# Patient Record
Sex: FEMALE | URBAN - METROPOLITAN AREA
[De-identification: names, ages, dates, MRNs, and addresses within clinical notes are randomized per-mention and may not be internally consistent; named-entity substitution may affect disease eponyms.]

---

## 2023-05-31 ENCOUNTER — ROUTINE PRENATAL (OUTPATIENT)
Dept: OBGYN CLINIC | Age: 33
End: 2023-05-31
Payer: MEDICAID

## 2023-05-31 VITALS
HEIGHT: 64 IN | BODY MASS INDEX: 27.66 KG/M2 | WEIGHT: 162 LBS | SYSTOLIC BLOOD PRESSURE: 110 MMHG | DIASTOLIC BLOOD PRESSURE: 70 MMHG

## 2023-05-31 DIAGNOSIS — O36.80X0 ENCOUNTER TO DETERMINE FETAL VIABILITY OF PREGNANCY, SINGLE OR UNSPECIFIED FETUS: ICD-10-CM

## 2023-05-31 DIAGNOSIS — Z34.81 MULTIGRAVIDA IN FIRST TRIMESTER: ICD-10-CM

## 2023-05-31 DIAGNOSIS — Z34.81 MULTIGRAVIDA IN FIRST TRIMESTER: Primary | ICD-10-CM

## 2023-05-31 LAB
ABO + RH BLD: NORMAL
BASOPHILS # BLD: 0.1 K/UL (ref 0–0.2)
BASOPHILS NFR BLD: 1 % (ref 0–2)
BLOOD GROUP ANTIBODIES SERPL: NORMAL
DIFFERENTIAL METHOD BLD: ABNORMAL
EOSINOPHIL # BLD: 0.3 K/UL (ref 0–0.8)
EOSINOPHIL NFR BLD: 4 % (ref 0.5–7.8)
ERYTHROCYTE [DISTWIDTH] IN BLOOD BY AUTOMATED COUNT: 12 % (ref 11.9–14.6)
HBV SURFACE AG SER QL: NONREACTIVE
HCT VFR BLD AUTO: 35.6 % (ref 35.8–46.3)
HCV AB SER QL: NONREACTIVE
HGB BLD-MCNC: 12 G/DL (ref 11.7–15.4)
HIV 1+2 AB+HIV1 P24 AG SERPL QL IA: NONREACTIVE
HIV 1/2 RESULT COMMENT: NORMAL
IMM GRANULOCYTES # BLD AUTO: 0 K/UL (ref 0–0.5)
IMM GRANULOCYTES NFR BLD AUTO: 0 % (ref 0–5)
LYMPHOCYTES # BLD: 1.6 K/UL (ref 0.5–4.6)
LYMPHOCYTES NFR BLD: 21 % (ref 13–44)
MCH RBC QN AUTO: 31.1 PG (ref 26.1–32.9)
MCHC RBC AUTO-ENTMCNC: 33.7 G/DL (ref 31.4–35)
MCV RBC AUTO: 92.2 FL (ref 82–102)
MONOCYTES # BLD: 0.4 K/UL (ref 0.1–1.3)
MONOCYTES NFR BLD: 6 % (ref 4–12)
NEUTS SEG # BLD: 5.1 K/UL (ref 1.7–8.2)
NEUTS SEG NFR BLD: 68 % (ref 43–78)
NRBC # BLD: 0 K/UL (ref 0–0.2)
PLATELET # BLD AUTO: 239 K/UL (ref 150–450)
PMV BLD AUTO: 11.6 FL (ref 9.4–12.3)
RBC # BLD AUTO: 3.86 M/UL (ref 4.05–5.2)
RUBV IGG SERPL IA-ACNC: >500 IU/ML
WBC # BLD AUTO: 7.4 K/UL (ref 4.3–11.1)

## 2023-05-31 PROCEDURE — 76801 OB US < 14 WKS SINGLE FETUS: CPT | Performed by: OBSTETRICS & GYNECOLOGY

## 2023-05-31 PROCEDURE — 99203 OFFICE O/P NEW LOW 30 MIN: CPT | Performed by: OBSTETRICS & GYNECOLOGY

## 2023-05-31 SDOH — ECONOMIC STABILITY: HOUSING INSECURITY
IN THE LAST 12 MONTHS, WAS THERE A TIME WHEN YOU DID NOT HAVE A STEADY PLACE TO SLEEP OR SLEPT IN A SHELTER (INCLUDING NOW)?: NO

## 2023-05-31 SDOH — ECONOMIC STABILITY: FOOD INSECURITY: WITHIN THE PAST 12 MONTHS, YOU WORRIED THAT YOUR FOOD WOULD RUN OUT BEFORE YOU GOT MONEY TO BUY MORE.: NEVER TRUE

## 2023-05-31 SDOH — ECONOMIC STABILITY: FOOD INSECURITY: WITHIN THE PAST 12 MONTHS, THE FOOD YOU BOUGHT JUST DIDN'T LAST AND YOU DIDN'T HAVE MONEY TO GET MORE.: NEVER TRUE

## 2023-05-31 SDOH — ECONOMIC STABILITY: INCOME INSECURITY: HOW HARD IS IT FOR YOU TO PAY FOR THE VERY BASICS LIKE FOOD, HOUSING, MEDICAL CARE, AND HEATING?: NOT HARD AT ALL

## 2023-05-31 ASSESSMENT — PATIENT HEALTH QUESTIONNAIRE - PHQ9
SUM OF ALL RESPONSES TO PHQ QUESTIONS 1-9: 0
SUM OF ALL RESPONSES TO PHQ QUESTIONS 1-9: 0
SUM OF ALL RESPONSES TO PHQ9 QUESTIONS 1 & 2: 0
2. FEELING DOWN, DEPRESSED OR HOPELESS: 0
1. LITTLE INTEREST OR PLEASURE IN DOING THINGS: 0
SUM OF ALL RESPONSES TO PHQ QUESTIONS 1-9: 0
SUM OF ALL RESPONSES TO PHQ QUESTIONS 1-9: 0

## 2023-05-31 NOTE — PROGRESS NOTES
Patient comes in today for initial prenatal visit. No complaints/concerns today. Fetal Movements:  No  Contractions:  No  Vaginal Bleeding:  No  Leaking Fluid:  No  GI/ issues:  No    Drug/Alcohol 4P's Plus Screening    1. Have either of your parents ever had a problem with drugs/alcohol/prescription drugs? Yes  2. Does your partner have a problem with drugs/alcohol/prescription drugs? No  3. In the past, have you ever had a problem with drugs/alcohol/prescription drugs? No  4. Before you were pregnant, in the past month, have you done any drugs, drank any alcohol or abused any prescription drugs? No  If \"YES\" to any of the above, please give further details:  MOB father was alcoholic 30 years ago    LAST PAP:  about a year ago    LAST MAMMO:  Never    LMP:  Patient's last menstrual period was 03/13/2023.     FAMILY HISTORY OF:   Breast Cancer:  No   Ovarian Cancer:  No   Uterine Cancer:  No   Colon Cancer:  No    Vitals:    05/31/23 0853   BP: 110/70   Site: Left Upper Arm   Position: Sitting   Weight: 162 lb (73.5 kg)   Height: 5' 4\" (1.626 m)        Kenyatta Hensley MA  05/31/23  9:05 AM

## 2023-05-31 NOTE — PROGRESS NOTES
Chief Complaint   Patient presents with    Initial Prenatal Visit    Pregnancy Ultrasound        This 29 y.o. [id number] at 9w0d with Estimated Date of Delivery: 1/3/24 presents for routine prenatal visit. Patient has no complaints today. Pt reports good FM, no LOF, VB, ctx. Pt denies H/A, vision changes, abdom pain, N/V. Vitals:    05/31/23 0853   BP: 110/70   Site: Left Upper Arm   Position: Sitting   Weight: 162 lb (73.5 kg)   Height: 5' 4\" (1.626 m)      Prenatal Physical     OB Prenatal Exam: Last filed by Inderjit Burgos MD on 5/31/2023  9:08 AM     General Physical Exam     HEENT: normal  Heart: normal  Skin: normal     Thyroid: normal  Lungs: normal  Extremities: normal     Lymph Nodes: normal  Neurological: normal  Abdomen: normal                           Patient Active Problem List    Diagnosis Date Noted    Multigravida in first trimester 05/31/2023     Overview Note:     EDC by 9 0/7 week US not C/W LMP       Assessment & Plan Note:     Instructed pt to contact the office or seek immediate care if develops fever > 101.0, severe lower abdominal pain or heavy vaginal bleeding (soaking 2 or more pads per hour). PNLs, new OB packet today        Problem List Items Addressed This Visit        Other    Multigravida in first trimester - Primary     Instructed pt to contact the office or seek immediate care if develops fever > 101.0, severe lower abdominal pain or heavy vaginal bleeding (soaking 2 or more pads per hour).      PNLs, new OB packet today         Relevant Orders    ABO/Rh    Antibody Screen    CBC with Auto Differential    Chlamydia, Gonorrhea, Trichomoniasis    Hemoglobin A1C    Hemoglobinopathy Evaluation    Hepatitis B Surface Antigen    Hepatitis C Antibody    HIV 1/2 Ag/Ab, 4TH Generation,W Rflx Confirm    RPR    Rubella antibody, IgG   Other Visit Diagnoses     Encounter to determine fetal viability of pregnancy, single or unspecified fetus        Relevant Orders    AMB POC US OB < 14

## 2023-06-01 LAB
EST. AVERAGE GLUCOSE BLD GHB EST-MCNC: 105 MG/DL
HBA1C MFR BLD: 5.3 % (ref 4.8–5.6)
RPR SER QL: NONREACTIVE

## 2023-06-02 LAB
HGB A MFR BLD: 97.2 % (ref 96.4–98.8)
HGB A2 MFR BLD COLUMN CHROM: 2.8 % (ref 1.8–3.2)
HGB F MFR BLD: 0 % (ref 0–2)
HGB FRACT BLD-IMP: NORMAL
HGB S MFR BLD: 0 %

## 2023-06-03 LAB
C TRACH RRNA SPEC QL NAA+PROBE: NEGATIVE
N GONORRHOEA RRNA SPEC QL NAA+PROBE: NEGATIVE
SPECIMEN SOURCE: NORMAL
T VAGINALIS RRNA SPEC QL NAA+PROBE: NEGATIVE

## 2023-06-07 ENCOUNTER — TELEPHONE (OUTPATIENT)
Dept: OBGYN CLINIC | Age: 33
End: 2023-06-07

## 2023-06-07 DIAGNOSIS — O21.9 NAUSEA AND VOMITING IN PREGNANCY: Primary | ICD-10-CM

## 2023-06-07 RX ORDER — ONDANSETRON 4 MG/1
4 TABLET, ORALLY DISINTEGRATING ORAL 3 TIMES DAILY PRN
Qty: 21 TABLET | Refills: 2 | Status: SHIPPED | OUTPATIENT
Start: 2023-06-07

## 2023-06-07 NOTE — TELEPHONE ENCOUNTER
Patient has called and sent [employer] message requesting nausea medication due to PN booklet medications not therapeutic for her n/v in pregnancy. Sent zofran RX. Updated patient via [employer] that RX has been sent.

## 2023-06-21 ENCOUNTER — ROUTINE PRENATAL (OUTPATIENT)
Dept: OBGYN CLINIC | Age: 33
End: 2023-06-21
Payer: COMMERCIAL

## 2023-06-21 VITALS
WEIGHT: 163 LBS | BODY MASS INDEX: 27.83 KG/M2 | DIASTOLIC BLOOD PRESSURE: 72 MMHG | HEIGHT: 64 IN | SYSTOLIC BLOOD PRESSURE: 106 MMHG

## 2023-06-21 DIAGNOSIS — Z34.81 MULTIGRAVIDA IN FIRST TRIMESTER: Primary | ICD-10-CM

## 2023-06-21 DIAGNOSIS — O09.291 HISTORY OF MACROSOMIA IN INFANT IN PRIOR PREGNANCY, CURRENTLY PREGNANT IN FIRST TRIMESTER: ICD-10-CM

## 2023-06-21 DIAGNOSIS — O21.9 NAUSEA/VOMITING IN PREGNANCY: ICD-10-CM

## 2023-06-21 PROCEDURE — 99213 OFFICE O/P EST LOW 20 MIN: CPT | Performed by: NURSE PRACTITIONER

## 2023-06-21 ASSESSMENT — PATIENT HEALTH QUESTIONNAIRE - PHQ9
SUM OF ALL RESPONSES TO PHQ9 QUESTIONS 1 & 2: 0
1. LITTLE INTEREST OR PLEASURE IN DOING THINGS: 0
SUM OF ALL RESPONSES TO PHQ QUESTIONS 1-9: 0
2. FEELING DOWN, DEPRESSED OR HOPELESS: 0
SUM OF ALL RESPONSES TO PHQ QUESTIONS 1-9: 0

## 2023-06-21 NOTE — PROGRESS NOTES
This is a 29 y.o.   at 12w0d for routine OB visit. Her Estimated Due Date is 1/3/2024, by Ultrasound    Denies leaking of fluid, vaginal bleeding, or regular contractions. Current Outpatient Medications on File Prior to Visit   Medication Sig Dispense Refill    Doxylamine Succinate, Sleep, (UNISOM PO) Take by mouth      Prenatal Vit-Fe Fumarate-FA (PRENATAL VITAMINS PO) Take by mouth      ondansetron (ZOFRAN-ODT) 4 MG disintegrating tablet Take 1 tablet by mouth 3 times daily as needed for Nausea or Vomiting (Patient not taking: Reported on 2023) 21 tablet 2     No current facility-administered medications on file prior to visit. Allergies   Allergen Reactions    Cefzil [Cefprozil] Hives       OB History    Para Term  AB Living   3 2 2 0 0 2   SAB IAB Ectopic Molar Multiple Live Births   0 0 0 0 0 2       # 1 - Date: 12, Sex: Male, Weight: 9 lb 1 oz (4.111 kg), GA: 40w0d, Delivery: Vaginal, Spontaneous, Apgar1: None, Apgar5: None, Living: Living, Birth Comments: None    # 2 - Date: 19, Sex: Female, Weight: 8 lb 6 oz (3.799 kg), GA: 40w0d, Delivery: Vaginal, Spontaneous, Apgar1: None, Apgar5: None, Living: Living, Birth Comments: None    # 3 - Date: None, Sex: None, Weight: None, GA: None, Delivery: None, Apgar1: None, Apgar5: None, Living: None, Birth Comments: None        History reviewed. No pertinent past medical history.     Past Surgical History:   Procedure Laterality Date    BUNIONECTOMY Left        Family History   Problem Relation Age of Onset    Breast Cancer Neg Hx     Colon Cancer Neg Hx     Uterine Cancer Neg Hx     Ovarian Cancer Neg Hx        Social History     Socioeconomic History    Marital status: Unknown     Spouse name: Not on file    Number of children: Not on file    Years of education: Not on file    Highest education level: Not on file   Occupational History    Not on file   Tobacco Use    Smoking status: Never    Smokeless

## 2023-06-21 NOTE — PROGRESS NOTES
I have reviewed the patient's visit today including history, exam and assessment by TORSTEN Butler. I agree with treatment/plan as above.     Judith Monzon MD  9:32 AM  06/21/23

## 2023-06-21 NOTE — PROGRESS NOTES
Patient comes in today for routine prenatal visit. Pt reports extreme nausea, zofran is not helping.      Fetal Movement: No  Contractions: No  Vaginal Bleeding: No  Leaking Fluid: No  GI/: Yes nausea     Vitals:    06/21/23 0817   BP: 106/72   Site: Left Upper Arm   Position: Sitting   Weight: 163 lb (73.9 kg)   Height: 5' 4\" (1.626 m)

## 2023-06-21 NOTE — ASSESSMENT & PLAN NOTE
PTL/labor precautions, [address], and pregnancy warning signs reviewed. Pt advised to call the office at [phone] or go straight to Labor and Delivery at [facility] with any of the following concerns vaginal bleeding, leaking of fluid, antonio regularly Q 5-7 minutes for over an hour or not feeling the baby move. D/W pt at length genetic testing that is recommended by ACOG -- NIPT, Quad screen (for trisomies and NTD), CF, SMA. Brief discussion of these diseases - conditions that may increase risks, etiology, carrier states, fetal effects, treatment options, etc was undertaken. D/W pt that these are screening tests/carrier screening test only and are NOT mandatory. We also discuss false POS/false neg rates. It is her decision whether to have them done and how to proceed with the information afterwards. All questions answered, pt understood and wishes to proceed with indicated tests.     RTO 4 weeks OBV

## 2023-06-27 ENCOUNTER — TELEPHONE (OUTPATIENT)
Dept: OBGYN CLINIC | Age: 33
End: 2023-06-27

## 2023-06-27 DIAGNOSIS — O21.9 NAUSEA/VOMITING IN PREGNANCY: Primary | ICD-10-CM

## 2023-06-27 RX ORDER — METOCLOPRAMIDE 10 MG/1
TABLET ORAL
Qty: 60 TABLET | Refills: 2 | Status: CANCELLED | OUTPATIENT
Start: 2023-06-27

## 2023-06-27 RX ORDER — METOCLOPRAMIDE 10 MG/1
10 TABLET ORAL
Qty: 90 TABLET | Refills: 1 | Status: SHIPPED | OUTPATIENT
Start: 2023-06-27

## 2023-06-29 LAB
Lab: NEGATIVE
Lab: NORMAL
NTRA CYSTIC FIBROSIS: NEGATIVE
NTRA DUCHENNE/BECKER MUSCULAR DYSTROPHY: NEGATIVE
NTRA FRAGILE X SYNDROME: NEGATIVE
NTRA SPINAL MUSCULAR ATROPHY: NEGATIVE

## 2023-07-18 ENCOUNTER — ROUTINE PRENATAL (OUTPATIENT)
Dept: OBGYN CLINIC | Age: 33
End: 2023-07-18

## 2023-07-18 VITALS
SYSTOLIC BLOOD PRESSURE: 118 MMHG | WEIGHT: 169 LBS | BODY MASS INDEX: 28.85 KG/M2 | DIASTOLIC BLOOD PRESSURE: 72 MMHG | HEIGHT: 64 IN

## 2023-07-18 DIAGNOSIS — Z34.82 MULTIGRAVIDA IN SECOND TRIMESTER: Primary | ICD-10-CM

## 2023-07-18 DIAGNOSIS — O09.292 HISTORY OF MACROSOMIA IN INFANT IN PRIOR PREGNANCY, CURRENTLY PREGNANT IN SECOND TRIMESTER: ICD-10-CM

## 2023-07-18 DIAGNOSIS — Z34.82 MULTIGRAVIDA IN SECOND TRIMESTER: ICD-10-CM

## 2023-07-18 DIAGNOSIS — O21.9 NAUSEA/VOMITING IN PREGNANCY: ICD-10-CM

## 2023-07-18 NOTE — PROGRESS NOTES
Patient comes in today for routine prenatal visit. Patient states she has cramping intermittently.      Fetal Movement: Yes, intermittent fluttering  Contractions: No  Vaginal Bleeding: No  Leaking Fluid: No  GI/: No    Vitals:    07/18/23 0812   BP: 118/72   Site: Left Upper Arm   Position: Sitting   Weight: 169 lb (76.7 kg)   Height: 5' 4\" (1.626 m)

## 2023-07-18 NOTE — PROGRESS NOTES
I have reviewed the patient's visit today including history, exam and assessment by TORSTEN Yarbrough. I agree with treatment/plan as above.     Shaylee Phillips MD  8:44 AM  07/18/23

## 2023-07-18 NOTE — ASSESSMENT & PLAN NOTE
PTL/labor precautions, [place], and pregnancy warning signs reviewed. Pt advised to call the office at [phone] or go straight to Labor and Delivery at [facility] with any of the following concerns vaginal bleeding, leaking of fluid, antonio regularly Q 5-7 minutes for over an hour or not feeling the baby move.    RTO 4 weeks OBV, anatomy US    afp today

## 2023-07-20 LAB
AFP INTERP SERPL-IMP: NORMAL
AFP MOM SERPL: 1.02
AFP SERPL-MCNC: 32.3 NG/ML
AGE AT DELIVERY: 32.6 YR
COMMENT: NORMAL
GA METHOD: NORMAL
GA: 15.9 WEEKS
IDDM PATIENT QL: NO
Lab: NORMAL
MAT SCN FOR FETAL ABNORMALITIES SERPL: NORMAL
MULTIPLE PREGNANCY: NO
NEURAL TUBE DEFECT RISK FETUS: NORMAL

## 2023-08-17 ENCOUNTER — ROUTINE PRENATAL (OUTPATIENT)
Dept: OBGYN CLINIC | Age: 33
End: 2023-08-17
Payer: COMMERCIAL

## 2023-08-17 VITALS
BODY MASS INDEX: 29.37 KG/M2 | HEIGHT: 64 IN | SYSTOLIC BLOOD PRESSURE: 116 MMHG | DIASTOLIC BLOOD PRESSURE: 70 MMHG | WEIGHT: 172 LBS

## 2023-08-17 DIAGNOSIS — O21.9 NAUSEA/VOMITING IN PREGNANCY: ICD-10-CM

## 2023-08-17 DIAGNOSIS — Z34.82 MULTIGRAVIDA IN SECOND TRIMESTER: ICD-10-CM

## 2023-08-17 DIAGNOSIS — Z36.89 ENCOUNTER FOR FETAL ANATOMIC SURVEY: Primary | ICD-10-CM

## 2023-08-17 DIAGNOSIS — O09.292 HISTORY OF MACROSOMIA IN INFANT IN PRIOR PREGNANCY, CURRENTLY PREGNANT IN SECOND TRIMESTER: ICD-10-CM

## 2023-08-17 PROCEDURE — 76805 OB US >/= 14 WKS SNGL FETUS: CPT | Performed by: NURSE PRACTITIONER

## 2023-08-17 PROCEDURE — 99213 OFFICE O/P EST LOW 20 MIN: CPT | Performed by: NURSE PRACTITIONER

## 2023-08-17 ASSESSMENT — PATIENT HEALTH QUESTIONNAIRE - PHQ9
SUM OF ALL RESPONSES TO PHQ9 QUESTIONS 1 & 2: 0
SUM OF ALL RESPONSES TO PHQ QUESTIONS 1-9: 0
1. LITTLE INTEREST OR PLEASURE IN DOING THINGS: 0
2. FEELING DOWN, DEPRESSED OR HOPELESS: 0
SUM OF ALL RESPONSES TO PHQ QUESTIONS 1-9: 0

## 2023-08-17 NOTE — PROGRESS NOTES
I have reviewed the patient's visit today including history, exam and assessment by Michelle Nielson NP-BC. I agree with treatment/plan as above.     Rylan Andrews MD  9:58 AM  08/17/23
Patient comes in today for routine prenatal visit. No complaints/concerns today.      Fetal Movement: Yes  Contractions: No  Vaginal Bleeding: No  Leaking Fluid: No  GI/: No    Vitals:    08/17/23 0851   BP: 116/70   Site: Left Upper Arm   Position: Sitting   Weight: 172 lb (78 kg)   Height: 5' 4\" (1.626 m)
tab which helps a lot. Plan to increase to 1 full tab in evening and start back zofran during the day. If no relief, plan to add on promethazine or reglan    7/18/23: improved with vit b6 and unisom  8/17/23: resolved        Multigravida in second trimester 05/31/2023     Overview Note:     EDC by 9 0/7 week US not C/W LMP    6/26/23: NIPT neg x3           Assessment & Plan Note:     PTL/labor precautions, [place], and pregnancy warning signs reviewed. Pt advised to call the office at [phone] or go straight to Labor and Delivery at [facility] with any of the following concerns vaginal bleeding, leaking of fluid, antonio regularly Q 5-7 minutes for over an hour or not feeling the baby move. RTO 4 weeks f/u anatomy    Anatomy us today nl but incomplete (profile, ductal arch)           Problem List Items Addressed This Visit          Digestive    Nausea/vomiting in pregnancy       Other    History of macrosomia in infant in prior pregnancy, currently pregnant in second trimester     noted           Relevant Orders    AMB POC US OB >= 14 WKS, 1ST GESTATION (Completed)    Multigravida in second trimester     PTL/labor precautions, [place], and pregnancy warning signs reviewed. Pt advised to call the office at [phone] or go straight to Labor and Delivery at [facility] with any of the following concerns vaginal bleeding, leaking of fluid, antonio regularly Q 5-7 minutes for over an hour or not feeling the baby move.    RTO 4 weeks f/u anatomy    Anatomy us today nl but incomplete (profile, ductal arch)           Relevant Orders    AMB POC US OB >= 14 WKS, 1ST GESTATION (Completed)     Other Visit Diagnoses       Encounter for fetal anatomic survey    -  Primary    Relevant Orders    AMB POC US OB >= 14 WKS, 1ST GESTATION (Completed)            Orders Placed This Encounter   Procedures    AMB POC US OB >= 14 WKS, 1ST GESTATION       Outpatient Encounter Medications as of 8/17/2023   Medication

## 2023-08-17 NOTE — ASSESSMENT & PLAN NOTE
PTL/labor precautions, [place], and pregnancy warning signs reviewed. Pt advised to call the office at [phone] or go straight to Labor and Delivery at [facility] with any of the following concerns vaginal bleeding, leaking of fluid, antonio regularly Q 5-7 minutes for over an hour or not feeling the baby move.    RTO 4 weeks f/u anatomy    Anatomy us today nl but incomplete (profile, ductal arch)

## 2023-09-14 ENCOUNTER — ROUTINE PRENATAL (OUTPATIENT)
Dept: OBGYN CLINIC | Age: 33
End: 2023-09-14

## 2023-09-14 VITALS
WEIGHT: 170 LBS | BODY MASS INDEX: 29.02 KG/M2 | SYSTOLIC BLOOD PRESSURE: 114 MMHG | DIASTOLIC BLOOD PRESSURE: 70 MMHG | HEIGHT: 64 IN

## 2023-09-14 DIAGNOSIS — Z36.2 ENCOUNTER FOR FOLLOW-UP ULTRASOUND OF FETAL ANATOMY: Primary | ICD-10-CM

## 2023-09-14 DIAGNOSIS — O09.292 HISTORY OF MACROSOMIA IN INFANT IN PRIOR PREGNANCY, CURRENTLY PREGNANT IN SECOND TRIMESTER: ICD-10-CM

## 2023-09-14 DIAGNOSIS — Z34.82 MULTIGRAVIDA IN SECOND TRIMESTER: ICD-10-CM

## 2023-09-14 ASSESSMENT — PATIENT HEALTH QUESTIONNAIRE - PHQ9
SUM OF ALL RESPONSES TO PHQ QUESTIONS 1-9: 0
2. FEELING DOWN, DEPRESSED OR HOPELESS: 0
1. LITTLE INTEREST OR PLEASURE IN DOING THINGS: 0
SUM OF ALL RESPONSES TO PHQ9 QUESTIONS 1 & 2: 0
SUM OF ALL RESPONSES TO PHQ QUESTIONS 1-9: 0

## 2023-09-14 NOTE — ASSESSMENT & PLAN NOTE
PTL/labor precautions, [place], and pregnancy warning signs reviewed. Pt advised to call the office at [phone] or go straight to Labor and Delivery at [facility] with any of the following concerns vaginal bleeding, leaking of fluid, antonio regularly Q 5-7 minutes for over an hour or not feeling the baby move.    F/u anatomy US today normal  RTO 4 weeks OBV, glucola, cbc, tdap

## 2023-09-14 NOTE — PROGRESS NOTES
I have reviewed the patient's visit today including history, exam and assessment by TORSTEN Mayers. I agree with treatment/plan as above.     Rachid Arango MD  9:24 AM  09/14/23
Patient comes in today for routine prenatal visit. Pt states about 3 times a day she has some tightness in her lower abdomen but does not last long.      Fetal Movement: Yes  Contractions: No  Vaginal Bleeding: No  Leaking Fluid: No  GI/: No    Vitals:    09/14/23 0849   BP: 114/70   Site: Left Upper Arm   Position: Sitting   Weight: 170 lb (77.1 kg)   Height: 5' 4\" (1.626 m)
Currently    Sexual activity: Yes     Partners: Male   Other Topics Concern    Not on file   Social History Narrative    Abuse: Feels safe at home, no history of physical abuse, no history of sexual abuse      Social Determinants of Health     Financial Resource Strain: Low Risk  (5/31/2023)    Overall Financial Resource Strain (CARDIA)     Difficulty of Paying Living Expenses: Not hard at all   Food Insecurity: No Food Insecurity (5/31/2023)    Hunger Vital Sign     Worried About Running Out of Food in the Last Year: Never true     Ran Out of Food in the Last Year: Never true   Transportation Needs: Unknown (5/31/2023)    PRAPARE - Transportation     Lack of Transportation (Medical): Not on file     Lack of Transportation (Non-Medical): No   Physical Activity: Not on file   Stress: Not on file   Social Connections: Not on file   Intimate Partner Violence: Not on file   Housing Stability: Unknown (5/31/2023)    Housing Stability Vital Sign     Unable to Pay for Housing in the Last Year: Not on file     Number of [address] in the Last Year: Not on file     Unstable Housing in the Last Year: No           Objective    Vitals:    09/14/23 0849   BP: 114/70   Site: Left Upper Arm   Position: Sitting   Weight: 170 lb (77.1 kg)   Height: 5' 4\" (1.626 m)       General: well developed, well nourished, in no acute distress    Head: normocephalic and atraumatic    Resp: even and unlabored    Psych: Normal mood and affect        Assessment and Plan      Patient Active Problem List    Diagnosis Date Noted    History of macrosomia in infant in prior pregnancy, currently pregnant in second trimester 06/21/2023     Overview Note:     G1 9lb 1 oz       Assessment & Plan Note:     noted      Nausea/vomiting in pregnancy 06/21/2023     Overview Note:     6/21/23: Nausea all day, vomiting once in evening. Taking unisom 1/2 tab which helps a lot. Plan to increase to 1 full tab in evening and start back zofran during the day.  If no

## 2023-10-12 ENCOUNTER — ROUTINE PRENATAL (OUTPATIENT)
Dept: OBGYN CLINIC | Age: 33
End: 2023-10-12
Payer: COMMERCIAL

## 2023-10-12 VITALS
WEIGHT: 182 LBS | DIASTOLIC BLOOD PRESSURE: 80 MMHG | HEIGHT: 64 IN | BODY MASS INDEX: 31.07 KG/M2 | SYSTOLIC BLOOD PRESSURE: 122 MMHG

## 2023-10-12 DIAGNOSIS — O09.293 HISTORY OF MACROSOMIA IN INFANT IN PRIOR PREGNANCY, CURRENTLY PREGNANT IN THIRD TRIMESTER: ICD-10-CM

## 2023-10-12 DIAGNOSIS — Z34.83 MULTIGRAVIDA IN THIRD TRIMESTER: ICD-10-CM

## 2023-10-12 DIAGNOSIS — Z34.82 MULTIGRAVIDA IN SECOND TRIMESTER: Primary | ICD-10-CM

## 2023-10-12 LAB
ERYTHROCYTE [DISTWIDTH] IN BLOOD BY AUTOMATED COUNT: 13.2 % (ref 11.9–14.6)
GLUCOSE 1 HOUR: 164 MG/DL
HCT VFR BLD AUTO: 33.7 % (ref 35.8–46.3)
HGB BLD-MCNC: 10.7 G/DL (ref 11.7–15.4)
MCH RBC QN AUTO: 29.5 PG (ref 26.1–32.9)
MCHC RBC AUTO-ENTMCNC: 31.8 G/DL (ref 31.4–35)
MCV RBC AUTO: 92.8 FL (ref 82–102)
NRBC # BLD: 0 K/UL (ref 0–0.2)
PLATELET # BLD AUTO: 153 K/UL (ref 150–450)
PMV BLD AUTO: 11.5 FL (ref 9.4–12.3)
RBC # BLD AUTO: 3.63 M/UL (ref 4.05–5.2)
WBC # BLD AUTO: 7.6 K/UL (ref 4.3–11.1)

## 2023-10-12 PROCEDURE — 90715 TDAP VACCINE 7 YRS/> IM: CPT | Performed by: NURSE PRACTITIONER

## 2023-10-12 PROCEDURE — 99213 OFFICE O/P EST LOW 20 MIN: CPT | Performed by: NURSE PRACTITIONER

## 2023-10-12 PROCEDURE — 90471 IMMUNIZATION ADMIN: CPT | Performed by: NURSE PRACTITIONER

## 2023-10-12 NOTE — ASSESSMENT & PLAN NOTE
PTL/labor precautions, [place], and pregnancy warning signs reviewed. Pt advised to call the office at [phone] or go straight to Labor and Delivery at [facility] with any of the following concerns vaginal bleeding, leaking of fluid, antonio regularly Q 5-7 minutes for over an hour or not feeling the baby move.    RTO 2 weeks with growth US  Glucola, cbc, tdap today

## 2023-10-13 DIAGNOSIS — O99.013 ANEMIA COMPLICATING PREGNANCY, THIRD TRIMESTER: ICD-10-CM

## 2023-10-13 DIAGNOSIS — O99.810 ABNORMAL GLUCOSE AFFECTING PREGNANCY: ICD-10-CM

## 2023-10-13 RX ORDER — FERROUS SULFATE 325(65) MG
325 TABLET ORAL 2 TIMES DAILY
Qty: 60 TABLET | Refills: 6 | Status: SHIPPED | OUTPATIENT
Start: 2023-10-13

## 2023-10-13 RX ORDER — DOCUSATE SODIUM 100 MG/1
100 CAPSULE, LIQUID FILLED ORAL 2 TIMES DAILY PRN
Qty: 60 CAPSULE | Refills: 6 | Status: SHIPPED | OUTPATIENT
Start: 2023-10-13

## 2023-10-13 NOTE — TELEPHONE ENCOUNTER
Called pt, message below reviewed with pt, pt voiced understanding and 3hr scheduled.      RX pend     Diet sent on [employer]

## 2023-10-13 NOTE — TELEPHONE ENCOUNTER
Please call patient and let her know that her hemoglobin was low. She needs to start taking     FeSO4 325mg PO BID Disp: 60 RF:6  Colace 100mg PO BID Disp: 60 RF: 6 prn for constipation    Also encourage foods that are high in iron. Also increase fluids and foods high in fiber to prevent constipation. 2. Patient has failed 1 hr Glucola. Needs to be scheduled for 3hr GTT.

## 2023-10-16 DIAGNOSIS — O99.810 ABNORMAL GLUCOSE AFFECTING PREGNANCY: Primary | ICD-10-CM

## 2023-10-18 DIAGNOSIS — O24.410 DIET CONTROLLED GESTATIONAL DIABETES MELLITUS (GDM) IN THIRD TRIMESTER: Primary | ICD-10-CM

## 2023-10-18 RX ORDER — GLUCOSAMINE HCL/CHONDROITIN SU 500-400 MG
1 CAPSULE ORAL 4 TIMES DAILY
Qty: 100 STRIP | Refills: 6 | Status: SHIPPED | OUTPATIENT
Start: 2023-10-18 | End: 2023-10-19

## 2023-10-18 RX ORDER — LANCETS 30 GAUGE
1 EACH MISCELLANEOUS 4 TIMES DAILY
Qty: 100 EACH | Refills: 6 | Status: SHIPPED | OUTPATIENT
Start: 2023-10-18 | End: 2023-10-19

## 2023-10-18 NOTE — TELEPHONE ENCOUNTER
Called pt, message below reviewed with pt, pt voiced understanding.      Order signed   Beba Mccullough

## 2023-10-18 NOTE — TELEPHONE ENCOUNTER
Patient did not pass 3hr GTT. New diagnosis of Gestational Diabetes. 1.) Pt needs to be scheduled for Diabetes Education at [facility]. I pended the order for the referral. They should call her soon to schedule. 2.) Pt needs to start checking blood sugars four times daily. Fasting and 1 hours after breakfast, lunch and dinner. A prescription for blood glucose meter, strips and lancets needs to be sent to the pharmacy. Glucometer   Glucose Test Strips Use four times daily to check blood sugars Disp: 100 RF: 6   Glucose Lancets Use four times daily to check blood sugars Disp: 100 RF:6    3.) Pt needs to write down her blood sugars and bring them to each visit here.

## 2023-10-19 ENCOUNTER — FOLLOWUP TELEPHONE ENCOUNTER (OUTPATIENT)
Dept: DIABETES SERVICES | Age: 33
End: 2023-10-19

## 2023-10-19 DIAGNOSIS — O24.410 DIET CONTROLLED GESTATIONAL DIABETES MELLITUS (GDM) IN THIRD TRIMESTER: Primary | ICD-10-CM

## 2023-10-19 RX ORDER — LANCETS 30 GAUGE
1 EACH MISCELLANEOUS 4 TIMES DAILY
Qty: 100 EACH | Refills: 6 | Status: SHIPPED | OUTPATIENT
Start: 2023-10-19

## 2023-10-19 RX ORDER — BLOOD SUGAR DIAGNOSTIC
STRIP MISCELLANEOUS
Qty: 100 STRIP | Refills: 6 | Status: SHIPPED | OUTPATIENT
Start: 2023-10-19

## 2023-10-19 RX ORDER — BLOOD-GLUCOSE METER
EACH MISCELLANEOUS
Qty: 1 EACH | Refills: 0 | Status: SHIPPED | OUTPATIENT
Start: 2023-10-19

## 2023-10-19 NOTE — TELEPHONE ENCOUNTER
----- Message from [address] [place] [address]. Jaylon sent at 10/19/2023  8:42 AM EDT -----  Regarding: Diabetes monitor  Contact: [phone]  They didn't so I had checked my insurance website and the only thing they had listed was written as \"blood glucose monitoring 333 device\" just as a generic name so I'm not sure.

## 2023-10-20 ENCOUNTER — FOLLOWUP TELEPHONE ENCOUNTER (OUTPATIENT)
Dept: DIABETES SERVICES | Age: 33
End: 2023-10-20

## 2023-10-26 ENCOUNTER — ROUTINE PRENATAL (OUTPATIENT)
Dept: OBGYN CLINIC | Age: 33
End: 2023-10-26

## 2023-10-26 VITALS
WEIGHT: 179 LBS | SYSTOLIC BLOOD PRESSURE: 122 MMHG | HEIGHT: 64 IN | DIASTOLIC BLOOD PRESSURE: 74 MMHG | BODY MASS INDEX: 30.56 KG/M2

## 2023-10-26 DIAGNOSIS — O99.013 ANEMIA COMPLICATING PREGNANCY, THIRD TRIMESTER: ICD-10-CM

## 2023-10-26 DIAGNOSIS — O24.410 DIET CONTROLLED GESTATIONAL DIABETES MELLITUS (GDM) IN THIRD TRIMESTER: Primary | ICD-10-CM

## 2023-10-26 DIAGNOSIS — Z34.83 MULTIGRAVIDA IN THIRD TRIMESTER: ICD-10-CM

## 2023-10-26 DIAGNOSIS — O09.293 HISTORY OF MACROSOMIA IN INFANT IN PRIOR PREGNANCY, CURRENTLY PREGNANT IN THIRD TRIMESTER: ICD-10-CM

## 2023-10-26 NOTE — ASSESSMENT & PLAN NOTE
D/W pt at length effects of diabetes in pregnancy and importance of glucose control Negative pregnancy effects discussed including but not limited to: IUFD, placental disease, macrosomia, shoulder dystocia, delayed lung maturity and permanent fetal damage. Encouraged compliance with diet, checking blood sugars, and medications if prescribed. Also encouraged patient to bring logs to each visit.

## 2023-10-26 NOTE — PROGRESS NOTES
Patient comes in today for routine prenatal visit. No complaints/concerns today.      Fetal Movement: Yes  Contractions: No  Vaginal Bleeding: No  Leaking Fluid: No  GI/: No    Vitals:    10/26/23 1456   BP: 122/74   Site: Left Upper Arm   Position: Sitting   Weight: 81.2 kg (179 lb)   Height: 1.626 m (5' 4\")

## 2023-10-26 NOTE — ASSESSMENT & PLAN NOTE
PTL/labor precautions, [place], and pregnancy warning signs reviewed. Pt advised to call the office at [phone] or go straight to Labor and Delivery at [facility] with any of the following concerns vaginal bleeding, leaking of fluid, antonio regularly Q 5-7 minutes for over an hour or not feeling the baby move.    RTO 2 weeks

## 2023-10-27 ENCOUNTER — FOLLOWUP TELEPHONE ENCOUNTER (OUTPATIENT)
Dept: DIABETES SERVICES | Age: 33
End: 2023-10-27

## 2023-10-27 NOTE — PROGRESS NOTES
I have reviewed the patient's visit today including history, exam and assessment by TORSTEN Aragon. I agree with treatment/plan as above.     Sadie Zhu MD  8:07 AM  10/27/23

## 2023-10-30 ENCOUNTER — FOLLOWUP TELEPHONE ENCOUNTER (OUTPATIENT)
Dept: DIABETES SERVICES | Age: 33
End: 2023-10-30

## 2023-11-09 ENCOUNTER — TELEPHONE (OUTPATIENT)
Dept: OBGYN CLINIC | Age: 33
End: 2023-11-09

## 2023-11-09 ENCOUNTER — PATIENT MESSAGE (OUTPATIENT)
Dept: OBGYN CLINIC | Age: 33
End: 2023-11-09

## 2023-11-09 DIAGNOSIS — O24.410 DIET CONTROLLED GESTATIONAL DIABETES MELLITUS (GDM) IN THIRD TRIMESTER: ICD-10-CM

## 2023-11-09 DIAGNOSIS — O24.410 DIET CONTROLLED GESTATIONAL DIABETES MELLITUS (GDM) IN THIRD TRIMESTER: Primary | ICD-10-CM

## 2023-11-09 NOTE — TELEPHONE ENCOUNTER
Called patient, updated her that PA has been submitted and at this time it can 48-72 hours for there to be a PA response. I stated to patient that typically the response time is sooner, patient will be updated as soon as there is a statement of approval or denial.     Patient verbalized understanding and stated that she has enough test strips for 2-3 days in the meantime.

## 2023-11-09 NOTE — PROGRESS NOTES
Re-sent BSG kit for which ever is covered by insurance due to pharmacy requesting PA for accu-check test strips.

## 2023-11-09 NOTE — TELEPHONE ENCOUNTER
From: Nelson Guevara  To: Luke Izaguirre  Sent: 11/9/2023 2:43 PM EST  Subject: Glucose test strips    Good afternoon, the pharmacy told me I needed to ask you guys to get prior authorization for the test strips because they will only cover 100 strips every 30 days not 25 days so the insurance won't let me refill the prescription yet without prior authorization and I have enough left for about 2 days. Any help is appreciated. Thank you!

## 2023-11-14 ENCOUNTER — ROUTINE PRENATAL (OUTPATIENT)
Dept: OBGYN CLINIC | Age: 33
End: 2023-11-14
Payer: COMMERCIAL

## 2023-11-14 VITALS
BODY MASS INDEX: 30.9 KG/M2 | SYSTOLIC BLOOD PRESSURE: 122 MMHG | WEIGHT: 181 LBS | HEIGHT: 64 IN | DIASTOLIC BLOOD PRESSURE: 74 MMHG

## 2023-11-14 DIAGNOSIS — O09.293 HISTORY OF MACROSOMIA IN INFANT IN PRIOR PREGNANCY, CURRENTLY PREGNANT IN THIRD TRIMESTER: ICD-10-CM

## 2023-11-14 DIAGNOSIS — O24.410 DIET CONTROLLED GESTATIONAL DIABETES MELLITUS (GDM) IN THIRD TRIMESTER: ICD-10-CM

## 2023-11-14 DIAGNOSIS — Z34.83 MULTIGRAVIDA IN THIRD TRIMESTER: ICD-10-CM

## 2023-11-14 DIAGNOSIS — O99.013 ANEMIA COMPLICATING PREGNANCY, THIRD TRIMESTER: ICD-10-CM

## 2023-11-14 PROBLEM — O21.9 NAUSEA/VOMITING IN PREGNANCY: Status: RESOLVED | Noted: 2023-06-21 | Resolved: 2023-11-14

## 2023-11-14 PROCEDURE — 99213 OFFICE O/P EST LOW 20 MIN: CPT | Performed by: OBSTETRICS & GYNECOLOGY

## 2023-11-14 NOTE — TELEPHONE ENCOUNTER
Sent [employer] message to patient trying to clarify concerns regarding her insurance due to recent PA response from [employer] that the request is being voided due to member no longer active with this plan.

## 2023-11-14 NOTE — PROGRESS NOTES
Patient comes in today for routine prenatal visit. No complaints/concerns today. Patient denies needing PA for BSG test strips due to not having an issue refilling her test strips at this time.       Fetal Movement: Yes  Contractions: No  Vaginal Bleeding: No  Leaking Fluid: No  GI/: No    Vitals:    11/14/23 1421   BP: 122/74   Site: Left Upper Arm   Position: Sitting   Weight: 82.1 kg (181 lb)   Height: 1.626 m (5' 4\")

## 2023-11-27 ENCOUNTER — ROUTINE PRENATAL (OUTPATIENT)
Dept: OBGYN CLINIC | Age: 33
End: 2023-11-27

## 2023-11-27 VITALS
DIASTOLIC BLOOD PRESSURE: 70 MMHG | SYSTOLIC BLOOD PRESSURE: 118 MMHG | HEIGHT: 64 IN | BODY MASS INDEX: 30.73 KG/M2 | WEIGHT: 180 LBS

## 2023-11-27 DIAGNOSIS — Z34.83 MULTIGRAVIDA IN THIRD TRIMESTER: ICD-10-CM

## 2023-11-27 DIAGNOSIS — O24.410 DIET CONTROLLED GESTATIONAL DIABETES MELLITUS (GDM) IN THIRD TRIMESTER: Primary | ICD-10-CM

## 2023-11-27 DIAGNOSIS — O99.013 ANEMIA COMPLICATING PREGNANCY, THIRD TRIMESTER: ICD-10-CM

## 2023-11-27 DIAGNOSIS — O09.293 HISTORY OF MACROSOMIA IN INFANT IN PRIOR PREGNANCY, CURRENTLY PREGNANT IN THIRD TRIMESTER: ICD-10-CM

## 2023-11-27 NOTE — PROGRESS NOTES
Patient comes in today for routine prenatal visit. No complaints/concerns today.      Fetal Movement: Yes  Contractions: No  Vaginal Bleeding: No  Leaking Fluid: No  GI/: No    Vitals:    11/27/23 0930   BP: 118/70   Site: Left Upper Arm   Position: Sitting   Weight: 81.6 kg (180 lb)   Height: 1.626 m (5' 4\")

## 2023-11-27 NOTE — ASSESSMENT & PLAN NOTE
Log reviewed - excellent control  D/W pt at length gestational diabetes and importance of glucose control with possible negative pregnancy effects including but not limited to: fetal death, macrosomia, shoulder dystocia, delayed lung maturity and permanent fetal damage. Encouraged compliance with diet, QID accuchecks and bringing log to each visit.

## 2023-11-27 NOTE — PATIENT INSTRUCTIONS
Please do the breech exercises as we discussed  eep following your diet and checking your sugars 4 times a day as directed. Please bring your log to each visit. If you develop signs and symptoms of  labor including but not limited to regular uterine contractions every 5-7 minutes for 1 hour, vaginal bleeding or leakage of fluid please contact our office and/or seek immediate care. Thanks for coming to see us today and letting us take care of you!

## 2023-12-04 ENCOUNTER — ROUTINE PRENATAL (OUTPATIENT)
Dept: OBGYN CLINIC | Age: 33
End: 2023-12-04
Payer: COMMERCIAL

## 2023-12-04 VITALS
BODY MASS INDEX: 31.07 KG/M2 | WEIGHT: 182 LBS | SYSTOLIC BLOOD PRESSURE: 122 MMHG | HEIGHT: 64 IN | DIASTOLIC BLOOD PRESSURE: 74 MMHG

## 2023-12-04 DIAGNOSIS — Z34.83 MULTIGRAVIDA IN THIRD TRIMESTER: Primary | ICD-10-CM

## 2023-12-04 DIAGNOSIS — O24.410 DIET CONTROLLED GESTATIONAL DIABETES MELLITUS (GDM) IN THIRD TRIMESTER: ICD-10-CM

## 2023-12-04 DIAGNOSIS — O99.013 ANEMIA COMPLICATING PREGNANCY, THIRD TRIMESTER: ICD-10-CM

## 2023-12-04 DIAGNOSIS — O09.293 HISTORY OF MACROSOMIA IN INFANT IN PRIOR PREGNANCY, CURRENTLY PREGNANT IN THIRD TRIMESTER: ICD-10-CM

## 2023-12-04 PROCEDURE — 99213 OFFICE O/P EST LOW 20 MIN: CPT | Performed by: OBSTETRICS & GYNECOLOGY

## 2023-12-04 PROCEDURE — 59025 FETAL NON-STRESS TEST: CPT | Performed by: OBSTETRICS & GYNECOLOGY

## 2023-12-04 NOTE — PROGRESS NOTES
Patient comes in today for routine prenatal visit. No complaints/concerns today.      Fetal Movement: Yes  Contractions: No  Vaginal Bleeding: No  Leaking Fluid: No  GI/: No    Vitals:    12/04/23 0818   BP: 122/74   Site: Left Upper Arm   Position: Sitting   Weight: 82.6 kg (182 lb)   Height: 1.626 m (5' 4\")

## 2023-12-04 NOTE — PATIENT INSTRUCTIONS
Keep following your diet and checking your sugars 4 times a day as directed. Please bring your log to each visit. If you develop signs and symptoms of  labor including but not limited to regular uterine contractions every 5-7 minutes for 1 hour, vaginal bleeding or leakage of fluid please contact our office and/or seek immediate care. Thanks for coming to see us today and letting us take care of you!

## 2023-12-04 NOTE — ASSESSMENT & PLAN NOTE
Educated patient of signs and symptoms of  labor including but not limited to regular uterine contractions every 5-7 minutes for 1 hour, vaginal bleeding or leakage of fluid to seek immediate care.
Log reviewed - excellent control  D/W pt at length gestational diabetes and importance of glucose control with possible negative pregnancy effects including but not limited to: fetal death, macrosomia, shoulder dystocia, delayed lung maturity and permanent fetal damage. Encouraged compliance with diet, QID accuchecks and bringing log to each visit.
noted
noted
 Symptoms

## 2023-12-04 NOTE — PROGRESS NOTES
Chief Complaint   Patient presents with    Routine Prenatal Visit     W/ NST        This 29 y.o. [id number] at 35w5d with Estimated Date of Delivery: 1/3/24 presents for routine prenatal visit. Patient has no complaints today. Pt reports good FM, no LOF, VB, ctx. Pt denies H/A, vision changes, abdom pain, N/V. Vitals:    12/04/23 0818   BP: 122/74   Site: Left Upper Arm   Position: Sitting   Weight: 82.6 kg (182 lb)   Height: 1.626 m (5' 4\")        Patient Active Problem List    Diagnosis Date Noted    Breech presentation of fetus 11/27/2023     Overview Note:     11/27/23:  EFW 17%, AC 14%, ELIN 9.1 cm, BREECH      Anemia complicating pregnancy, third trimester 10/13/2023     Overview Note:     Add'l Fe       Assessment & Plan Note:     noted      Diet controlled gestational diabetes mellitus (GDM) in third trimester 10/13/2023     Overview Note:     Failed 1 hr glucola, 3 hr FAILED    10/26/2023: BS log reviewed, mostly all within target. Pt has not yet scheduled diet teaching appt, advised to call and schedule. EFW 22%, AC 24%, ELIN nl, BREECH  11/27/23:  EFW 17%, AC 14%, ELIN 9.1 cm, BREECH       Assessment & Plan Note:     Log reviewed - excellent control  D/W pt at length gestational diabetes and importance of glucose control with possible negative pregnancy effects including but not limited to: fetal death, macrosomia, shoulder dystocia, delayed lung maturity and permanent fetal damage. Encouraged compliance with diet, QID accuchecks and bringing log to each visit. History of macrosomia in infant in prior pregnancy, currently pregnant in third trimester 06/21/2023     Overview Note:     G1 9lb 1 oz    10/26/2023: JANUARY signed for records today. Pt reports episiotomy/vacuum with G1, unsure if any shoulder dystocia was present. 11/14/2023: records received from delivery 2019. VAVD with RML episiotomy. No evidence of shoulder dystocia.    11/27/23:  EFW 17%, AC 14%, ELIN 9.1 cm, BREECH       Assessment & Plan

## 2023-12-12 ENCOUNTER — ROUTINE PRENATAL (OUTPATIENT)
Dept: OBGYN CLINIC | Age: 33
End: 2023-12-12
Payer: COMMERCIAL

## 2023-12-12 VITALS
BODY MASS INDEX: 30.73 KG/M2 | HEIGHT: 64 IN | DIASTOLIC BLOOD PRESSURE: 62 MMHG | SYSTOLIC BLOOD PRESSURE: 114 MMHG | WEIGHT: 180 LBS

## 2023-12-12 DIAGNOSIS — O99.013 ANEMIA COMPLICATING PREGNANCY, THIRD TRIMESTER: ICD-10-CM

## 2023-12-12 DIAGNOSIS — Z34.83 MULTIGRAVIDA IN THIRD TRIMESTER: ICD-10-CM

## 2023-12-12 DIAGNOSIS — O24.410 DIET CONTROLLED GESTATIONAL DIABETES MELLITUS (GDM) IN THIRD TRIMESTER: ICD-10-CM

## 2023-12-12 DIAGNOSIS — O09.293 HISTORY OF MACROSOMIA IN INFANT IN PRIOR PREGNANCY, CURRENTLY PREGNANT IN THIRD TRIMESTER: ICD-10-CM

## 2023-12-12 LAB
BASOPHILS # BLD: 0 K/UL (ref 0–0.2)
BASOPHILS NFR BLD: 1 % (ref 0–2)
DIFFERENTIAL METHOD BLD: ABNORMAL
EOSINOPHIL # BLD: 0.2 K/UL (ref 0–0.8)
EOSINOPHIL NFR BLD: 3 % (ref 0.5–7.8)
ERYTHROCYTE [DISTWIDTH] IN BLOOD BY AUTOMATED COUNT: 14.1 % (ref 11.9–14.6)
HCT VFR BLD AUTO: 34 % (ref 35.8–46.3)
HGB BLD-MCNC: 11.3 G/DL (ref 11.7–15.4)
IMM GRANULOCYTES # BLD AUTO: 0 K/UL (ref 0–0.5)
IMM GRANULOCYTES NFR BLD AUTO: 0 % (ref 0–5)
LYMPHOCYTES # BLD: 1.3 K/UL (ref 0.5–4.6)
LYMPHOCYTES NFR BLD: 18 % (ref 13–44)
MCH RBC QN AUTO: 30.5 PG (ref 26.1–32.9)
MCHC RBC AUTO-ENTMCNC: 33.2 G/DL (ref 31.4–35)
MCV RBC AUTO: 91.6 FL (ref 82–102)
MONOCYTES # BLD: 0.5 K/UL (ref 0.1–1.3)
MONOCYTES NFR BLD: 7 % (ref 4–12)
NEUTS SEG NFR BLD: 71 % (ref 43–78)
NRBC # BLD: 0 K/UL (ref 0–0.2)
PLATELET # BLD AUTO: 143 K/UL (ref 150–450)
PMV BLD AUTO: 11.8 FL (ref 9.4–12.3)
RBC # BLD AUTO: 3.71 M/UL (ref 4.05–5.2)
WBC # BLD AUTO: 7.2 K/UL (ref 4.3–11.1)

## 2023-12-12 PROCEDURE — 76816 OB US FOLLOW-UP PER FETUS: CPT | Performed by: OBSTETRICS & GYNECOLOGY

## 2023-12-12 PROCEDURE — 76820 UMBILICAL ARTERY ECHO: CPT | Performed by: OBSTETRICS & GYNECOLOGY

## 2023-12-12 PROCEDURE — 99213 OFFICE O/P EST LOW 20 MIN: CPT | Performed by: OBSTETRICS & GYNECOLOGY

## 2023-12-12 PROCEDURE — 76819 FETAL BIOPHYS PROFIL W/O NST: CPT | Performed by: OBSTETRICS & GYNECOLOGY

## 2023-12-12 NOTE — PROGRESS NOTES
Patient comes in today for routine prenatal visit. No complaints/concerns today.      Fetal Movement: Yes  Contractions: No  Vaginal Bleeding: No  Leaking Fluid: No  GI/: No    Vitals:    12/12/23 1018   BP: 114/62   Site: Left Upper Arm   Position: Sitting   Weight: 81.6 kg (180 lb)   Height: 1.626 m (5' 4\")

## 2023-12-12 NOTE — ASSESSMENT & PLAN NOTE
Log reviewed - excellent control  D/W pt at length gestational diabetes and importance of glucose control with possible negative pregnancy effects including but not limited to: fetal death, macrosomia, shoulder dystocia, delayed lung maturity and permanent fetal damage.   Encouraged compliance with diet, QID accuchecks and bringing log to each visit

## 2023-12-12 NOTE — PROGRESS NOTES
Chaperone for Intimate Exam     Chaperone was offer accepted as part of the rooming process    Chaperone: Donato Opitz

## 2023-12-12 NOTE — PROGRESS NOTES
Chief Complaint   Patient presents with    Routine Prenatal Visit    Ultrasound        This 29 y.o. [id number] at 36w6d with Estimated Date of Delivery: 1/3/24 presents for routine prenatal visit. Patient has no complaints today. Pt reports good FM, no LOF, VB, ctx. Pt denies H/A, vision changes, abdom pain, N/V. Vitals:    12/12/23 1018   BP: 114/62   Site: Left Upper Arm   Position: Sitting   Weight: 81.6 kg (180 lb)   Height: 1.626 m (5' 4\")        Patient Active Problem List    Diagnosis Date Noted    Breech presentation of fetus 11/27/2023     Overview Note:     11/27/23:  EFW 17%, AC 14%, ELIN 9.1 cm, BREECH      Anemia complicating pregnancy, third trimester 10/13/2023     Overview Note:     Add'l Fe       Assessment & Plan Note:      noted      Diet controlled gestational diabetes mellitus (GDM) in third trimester 10/13/2023     Overview Note:     Failed 1 hr glucola, 3 hr FAILED    10/26/2023: BS log reviewed, mostly all within target. Pt has not yet scheduled diet teaching appt, advised to call and schedule. EFW 22%, AC 24%, ELIN nl, BREECH  11/27/23:  EFW 17%, AC 14%, ELIN 9.1 cm, BREECH  12/11/23:  EFW 15%, AC 13%, ELIN 11.5 cm, vtx       Assessment & Plan Note:      Log reviewed - excellent control  D/W pt at length gestational diabetes and importance of glucose control with possible negative pregnancy effects including but not limited to: fetal death, macrosomia, shoulder dystocia, delayed lung maturity and permanent fetal damage. Encouraged compliance with diet, QID accuchecks and bringing log to each visit      History of macrosomia in infant in prior pregnancy, currently pregnant in third trimester 06/21/2023     Overview Note:     G1 9lb 1 oz    10/26/2023: JANUARY signed for records today. Pt reports episiotomy/vacuum with G1, unsure if any shoulder dystocia was present. 11/14/2023: records received from delivery 2019. VAVD with RML episiotomy. No evidence of shoulder dystocia.    11/27/23:  EFW 17%, AC

## 2023-12-16 LAB
BACTERIA SPEC CULT: NORMAL
SERVICE CMNT-IMP: NORMAL

## 2023-12-18 PROBLEM — Z03.71 ENCOUNTER FOR SUSPECTED PREMATURE RUPTURE OF AMNIOTIC MEMBRANES, WITH RUPTURE OF MEMBRANES NOT FOUND: Status: ACTIVE | Noted: 2023-12-18

## 2023-12-26 ENCOUNTER — PREP FOR PROCEDURE (OUTPATIENT)
Dept: OBGYN CLINIC | Age: 33
End: 2023-12-26

## 2023-12-26 ENCOUNTER — ROUTINE PRENATAL (OUTPATIENT)
Dept: OBGYN CLINIC | Age: 33
End: 2023-12-26
Payer: COMMERCIAL

## 2023-12-26 VITALS
BODY MASS INDEX: 31.24 KG/M2 | HEIGHT: 64 IN | SYSTOLIC BLOOD PRESSURE: 112 MMHG | DIASTOLIC BLOOD PRESSURE: 78 MMHG | WEIGHT: 183 LBS

## 2023-12-26 DIAGNOSIS — O09.293 HISTORY OF MACROSOMIA IN INFANT IN PRIOR PREGNANCY, CURRENTLY PREGNANT IN THIRD TRIMESTER: ICD-10-CM

## 2023-12-26 DIAGNOSIS — Z34.83 MULTIGRAVIDA IN THIRD TRIMESTER: Primary | ICD-10-CM

## 2023-12-26 DIAGNOSIS — O99.013 ANEMIA COMPLICATING PREGNANCY, THIRD TRIMESTER: ICD-10-CM

## 2023-12-26 DIAGNOSIS — O24.410 DIET CONTROLLED GESTATIONAL DIABETES MELLITUS (GDM) IN THIRD TRIMESTER: ICD-10-CM

## 2023-12-26 PROBLEM — Z03.71 ENCOUNTER FOR SUSPECTED PREMATURE RUPTURE OF AMNIOTIC MEMBRANES, WITH RUPTURE OF MEMBRANES NOT FOUND: Status: RESOLVED | Noted: 2023-12-18 | Resolved: 2023-12-26

## 2023-12-26 PROCEDURE — 99213 OFFICE O/P EST LOW 20 MIN: CPT | Performed by: OBSTETRICS & GYNECOLOGY

## 2023-12-26 RX ORDER — DEXTROSE, SODIUM CHLORIDE, SODIUM LACTATE, POTASSIUM CHLORIDE, AND CALCIUM CHLORIDE 5; .6; .31; .03; .02 G/100ML; G/100ML; G/100ML; G/100ML; G/100ML
INJECTION, SOLUTION INTRAVENOUS CONTINUOUS
Status: CANCELLED | OUTPATIENT
Start: 2023-12-26

## 2023-12-26 RX ORDER — SODIUM CHLORIDE 0.9 % (FLUSH) 0.9 %
5-40 SYRINGE (ML) INJECTION EVERY 12 HOURS SCHEDULED
Status: CANCELLED | OUTPATIENT
Start: 2023-12-26

## 2023-12-26 RX ORDER — SODIUM CHLORIDE 9 MG/ML
INJECTION, SOLUTION INTRAVENOUS PRN
Status: CANCELLED | OUTPATIENT
Start: 2023-12-26

## 2023-12-26 RX ORDER — MISOPROSTOL 100 UG/1
200 TABLET ORAL PRN
Status: CANCELLED | OUTPATIENT
Start: 2023-12-26

## 2023-12-26 RX ORDER — SODIUM CHLORIDE, SODIUM LACTATE, POTASSIUM CHLORIDE, AND CALCIUM CHLORIDE .6; .31; .03; .02 G/100ML; G/100ML; G/100ML; G/100ML
1000 INJECTION, SOLUTION INTRAVENOUS PRN
Status: CANCELLED | OUTPATIENT
Start: 2023-12-26

## 2023-12-26 RX ORDER — SODIUM CHLORIDE, SODIUM LACTATE, POTASSIUM CHLORIDE, AND CALCIUM CHLORIDE .6; .31; .03; .02 G/100ML; G/100ML; G/100ML; G/100ML
500 INJECTION, SOLUTION INTRAVENOUS PRN
Status: CANCELLED | OUTPATIENT
Start: 2023-12-26

## 2023-12-26 RX ORDER — ONDANSETRON 2 MG/ML
4 INJECTION INTRAMUSCULAR; INTRAVENOUS EVERY 6 HOURS PRN
Status: CANCELLED | OUTPATIENT
Start: 2023-12-26

## 2023-12-26 RX ORDER — METHYLERGONOVINE MALEATE 0.2 MG/ML
200 INJECTION INTRAVENOUS PRN
Status: CANCELLED | OUTPATIENT
Start: 2023-12-26

## 2023-12-26 RX ORDER — SODIUM CHLORIDE 0.9 % (FLUSH) 0.9 %
5-40 SYRINGE (ML) INJECTION PRN
Status: CANCELLED | OUTPATIENT
Start: 2023-12-26

## 2023-12-26 RX ORDER — TERBUTALINE SULFATE 1 MG/ML
0.25 INJECTION, SOLUTION SUBCUTANEOUS ONCE
Status: CANCELLED | OUTPATIENT
Start: 2023-12-26 | End: 2023-12-26

## 2023-12-26 NOTE — ASSESSMENT & PLAN NOTE
Educated patient of signs and symptoms of labor including but not limited to regular uterine contractions every 5-7 minutes for 1 hour, vaginal bleeding or leakage of fluid to seek immediate care. D/W pt at length induction vs spontaneous labor risks and benefits including but not limited to: favorable cervix, Crowder's score, elective/prophylactic vs medical induction, possible increased risk of longer latent stage, possible increased risk of FHT's abnormalities, tachysystole, possible increased risk of , placental abruption, uterine rupture, varying methods of cervical ripening and induction (cytotec, cervical balloon, cervidil and pitocin)  Also D/W that with elective/prophylactic inductions, having her induction postponed for medically indicated inductions is always a possibility. Pt understands, accepts all known and unknown risks and wishes to proceed.

## 2023-12-26 NOTE — PATIENT INSTRUCTIONS
Please call Labor and Delivery ([phone]) Thursday morning at 5:00 am and they will tell you when to be there. I will see you later that morning! If you develop signs and symptoms of labor including but not limited to regular uterine contractions every 5-7 minutes for 1 hour, vaginal bleeding or leakage of fluid please contact our office and/or seek immediate care. Thanks for coming to see us today and letting us take care of you!

## 2023-12-28 ENCOUNTER — APPOINTMENT (OUTPATIENT)
Dept: LABOR AND DELIVERY | Age: 33
End: 2023-12-28
Payer: COMMERCIAL

## 2023-12-28 ENCOUNTER — ANESTHESIA (OUTPATIENT)
Dept: LABOR AND DELIVERY | Age: 33
End: 2023-12-28
Payer: COMMERCIAL

## 2023-12-28 ENCOUNTER — HOSPITAL ENCOUNTER (INPATIENT)
Age: 33
LOS: 2 days | Discharge: HOME | End: 2023-12-30
Attending: OBSTETRICS & GYNECOLOGY | Admitting: OBSTETRICS & GYNECOLOGY
Payer: COMMERCIAL

## 2023-12-28 ENCOUNTER — ANESTHESIA EVENT (OUTPATIENT)
Dept: LABOR AND DELIVERY | Age: 33
End: 2023-12-28
Payer: COMMERCIAL

## 2023-12-28 LAB
ABO + RH BLD: NORMAL
BASOPHILS # BLD: 0 K/UL (ref 0–0.2)
BASOPHILS NFR BLD: 1 % (ref 0–2)
BLOOD BANK CMNT PATIENT-IMP: NORMAL
BLOOD GROUP ANTIBODIES SERPL: NORMAL
DIFFERENTIAL METHOD BLD: ABNORMAL
EOSINOPHIL # BLD: 0.2 K/UL (ref 0–0.8)
EOSINOPHIL NFR BLD: 2 % (ref 0.5–7.8)
ERYTHROCYTE [DISTWIDTH] IN BLOOD BY AUTOMATED COUNT: 13.7 % (ref 11.9–14.6)
HCT VFR BLD AUTO: 34.3 % (ref 35.8–46.3)
HGB BLD-MCNC: 11.7 G/DL (ref 11.7–15.4)
IMM GRANULOCYTES # BLD AUTO: 0 K/UL (ref 0–0.5)
IMM GRANULOCYTES NFR BLD AUTO: 1 % (ref 0–5)
LYMPHOCYTES # BLD: 1.7 K/UL (ref 0.5–4.6)
LYMPHOCYTES NFR BLD: 22 % (ref 13–44)
MCH RBC QN AUTO: 30.4 PG (ref 26.1–32.9)
MCHC RBC AUTO-ENTMCNC: 34.1 G/DL (ref 31.4–35)
MCV RBC AUTO: 89.1 FL (ref 82–102)
MONOCYTES # BLD: 0.5 K/UL (ref 0.1–1.3)
MONOCYTES NFR BLD: 7 % (ref 4–12)
NEUTS SEG # BLD: 5 K/UL (ref 1.7–8.2)
NEUTS SEG NFR BLD: 67 % (ref 43–78)
NRBC # BLD: 0 K/UL (ref 0–0.2)
PLATELET # BLD AUTO: 142 K/UL (ref 150–450)
PMV BLD AUTO: 11.8 FL (ref 9.4–12.3)
RBC # BLD AUTO: 3.85 M/UL (ref 4.05–5.2)
SPECIMEN EXP DATE BLD: NORMAL
WBC # BLD AUTO: 7.5 K/UL (ref 4.3–11.1)

## 2023-12-28 PROCEDURE — 6370000000 HC RX 637 (ALT 250 FOR IP): Performed by: OBSTETRICS & GYNECOLOGY

## 2023-12-28 PROCEDURE — 86900 BLOOD TYPING SEROLOGIC ABO: CPT

## 2023-12-28 PROCEDURE — 3E033VJ INTRODUCTION OF OTHER HORMONE INTO PERIPHERAL VEIN, PERCUTANEOUS APPROACH: ICD-10-PCS | Performed by: PHYSICAL MEDICINE & REHABILITATION

## 2023-12-28 PROCEDURE — 7210000100 HC LABOR FEE PER 1 HR

## 2023-12-28 PROCEDURE — 3700000025 EPIDURAL BLOCK: Performed by: STUDENT IN AN ORGANIZED HEALTH CARE EDUCATION/TRAINING PROGRAM

## 2023-12-28 PROCEDURE — 4A1HXCZ MONITORING OF PRODUCTS OF CONCEPTION, CARDIAC RATE, EXTERNAL APPROACH: ICD-10-PCS | Performed by: PHYSICAL MEDICINE & REHABILITATION

## 2023-12-28 PROCEDURE — 86901 BLOOD TYPING SEROLOGIC RH(D): CPT

## 2023-12-28 PROCEDURE — 0UQMXZZ REPAIR VULVA, EXTERNAL APPROACH: ICD-10-PCS | Performed by: PHYSICAL MEDICINE & REHABILITATION

## 2023-12-28 PROCEDURE — 51701 INSERT BLADDER CATHETER: CPT

## 2023-12-28 PROCEDURE — 86850 RBC ANTIBODY SCREEN: CPT

## 2023-12-28 PROCEDURE — 0KQM0ZZ REPAIR PERINEUM MUSCLE, OPEN APPROACH: ICD-10-PCS | Performed by: PHYSICAL MEDICINE & REHABILITATION

## 2023-12-28 PROCEDURE — 1100000000 HC RM PRIVATE

## 2023-12-28 PROCEDURE — 7100000011 HC PHASE II RECOVERY - ADDTL 15 MIN

## 2023-12-28 PROCEDURE — 59409 OBSTETRICAL CARE: CPT | Performed by: OBSTETRICS & GYNECOLOGY

## 2023-12-28 PROCEDURE — 2580000003 HC RX 258: Performed by: OBSTETRICS & GYNECOLOGY

## 2023-12-28 PROCEDURE — 85025 COMPLETE CBC W/AUTO DIFF WBC: CPT

## 2023-12-28 PROCEDURE — 6360000002 HC RX W HCPCS: Performed by: OBSTETRICS & GYNECOLOGY

## 2023-12-28 PROCEDURE — 7220000101 HC DELIVERY VAGINAL/SINGLE

## 2023-12-28 PROCEDURE — 6360000002 HC RX W HCPCS: Performed by: STUDENT IN AN ORGANIZED HEALTH CARE EDUCATION/TRAINING PROGRAM

## 2023-12-28 PROCEDURE — 10907ZC DRAINAGE OF AMNIOTIC FLUID, THERAPEUTIC FROM PRODUCTS OF CONCEPTION, VIA NATURAL OR ARTIFICIAL OPENING: ICD-10-PCS | Performed by: PHYSICAL MEDICINE & REHABILITATION

## 2023-12-28 PROCEDURE — 7100000010 HC PHASE II RECOVERY - FIRST 15 MIN

## 2023-12-28 RX ORDER — SODIUM CHLORIDE 0.9 % (FLUSH) 0.9 %
5-40 SYRINGE (ML) INJECTION PRN
Status: DISCONTINUED | OUTPATIENT
Start: 2023-12-28 | End: 2023-12-30 | Stop reason: HOSPADM

## 2023-12-28 RX ORDER — SODIUM CHLORIDE, SODIUM LACTATE, POTASSIUM CHLORIDE, AND CALCIUM CHLORIDE .6; .31; .03; .02 G/100ML; G/100ML; G/100ML; G/100ML
500 INJECTION, SOLUTION INTRAVENOUS PRN
Status: DISCONTINUED | OUTPATIENT
Start: 2023-12-28 | End: 2023-12-30 | Stop reason: HOSPADM

## 2023-12-28 RX ORDER — METHYLERGONOVINE MALEATE 0.2 MG/ML
200 INJECTION INTRAVENOUS PRN
Status: DISCONTINUED | OUTPATIENT
Start: 2023-12-28 | End: 2023-12-30 | Stop reason: HOSPADM

## 2023-12-28 RX ORDER — DEXTROSE, SODIUM CHLORIDE, SODIUM LACTATE, POTASSIUM CHLORIDE, AND CALCIUM CHLORIDE 5; .6; .31; .03; .02 G/100ML; G/100ML; G/100ML; G/100ML; G/100ML
INJECTION, SOLUTION INTRAVENOUS CONTINUOUS
Status: DISCONTINUED | OUTPATIENT
Start: 2023-12-28 | End: 2023-12-30 | Stop reason: HOSPADM

## 2023-12-28 RX ORDER — SODIUM CHLORIDE 0.9 % (FLUSH) 0.9 %
5-40 SYRINGE (ML) INJECTION EVERY 12 HOURS SCHEDULED
Status: DISCONTINUED | OUTPATIENT
Start: 2023-12-28 | End: 2023-12-29 | Stop reason: ALTCHOICE

## 2023-12-28 RX ORDER — SODIUM CHLORIDE, SODIUM LACTATE, POTASSIUM CHLORIDE, AND CALCIUM CHLORIDE .6; .31; .03; .02 G/100ML; G/100ML; G/100ML; G/100ML
1000 INJECTION, SOLUTION INTRAVENOUS PRN
Status: DISCONTINUED | OUTPATIENT
Start: 2023-12-28 | End: 2023-12-30 | Stop reason: HOSPADM

## 2023-12-28 RX ORDER — TRANEXAMIC ACID 10 MG/ML
1000 INJECTION, SOLUTION INTRAVENOUS
Status: ACTIVE | OUTPATIENT
Start: 2023-12-28 | End: 2023-12-29

## 2023-12-28 RX ORDER — SODIUM CHLORIDE 9 MG/ML
INJECTION, SOLUTION INTRAVENOUS PRN
Status: DISCONTINUED | OUTPATIENT
Start: 2023-12-28 | End: 2023-12-30 | Stop reason: HOSPADM

## 2023-12-28 RX ORDER — ONDANSETRON 2 MG/ML
4 INJECTION INTRAMUSCULAR; INTRAVENOUS EVERY 6 HOURS PRN
Status: DISCONTINUED | OUTPATIENT
Start: 2023-12-28 | End: 2023-12-30 | Stop reason: HOSPADM

## 2023-12-28 RX ORDER — MISOPROSTOL 200 UG/1
200 TABLET ORAL PRN
Status: DISCONTINUED | OUTPATIENT
Start: 2023-12-28 | End: 2023-12-30 | Stop reason: HOSPADM

## 2023-12-28 RX ORDER — TERBUTALINE SULFATE 1 MG/ML
0.25 INJECTION, SOLUTION SUBCUTANEOUS ONCE
Status: DISCONTINUED | OUTPATIENT
Start: 2023-12-28 | End: 2023-12-30 | Stop reason: HOSPADM

## 2023-12-28 RX ORDER — ROPIVACAINE HYDROCHLORIDE 2 MG/ML
INJECTION, SOLUTION EPIDURAL; INFILTRATION; PERINEURAL PRN
Status: DISCONTINUED | OUTPATIENT
Start: 2023-12-28 | End: 2023-12-28 | Stop reason: SDUPTHER

## 2023-12-28 RX ADMIN — MISOPROSTOL 200 MCG: 200 TABLET ORAL at 21:45

## 2023-12-28 RX ADMIN — ROPIVACAINE HYDROCHLORIDE 8 ML: 2 INJECTION, SOLUTION EPIDURAL; INFILTRATION; PERINEURAL at 13:47

## 2023-12-28 RX ADMIN — SODIUM CHLORIDE, SODIUM LACTATE, POTASSIUM CHLORIDE, CALCIUM CHLORIDE AND DEXTROSE MONOHYDRATE: 5; 600; 310; 30; 20 INJECTION, SOLUTION INTRAVENOUS at 08:06

## 2023-12-28 RX ADMIN — SODIUM CHLORIDE, POTASSIUM CHLORIDE, SODIUM LACTATE AND CALCIUM CHLORIDE 1000 ML: 600; 310; 30; 20 INJECTION, SOLUTION INTRAVENOUS at 13:59

## 2023-12-28 RX ADMIN — Medication 16.65 ML/MIN: at 21:45

## 2023-12-28 RX ADMIN — METHYLERGONOVINE MALEATE 200 MCG: 0.2 INJECTION, SOLUTION INTRAMUSCULAR; INTRAVENOUS at 21:45

## 2023-12-28 RX ADMIN — SODIUM CHLORIDE, SODIUM LACTATE, POTASSIUM CHLORIDE, CALCIUM CHLORIDE AND DEXTROSE MONOHYDRATE: 5; 600; 310; 30; 20 INJECTION, SOLUTION INTRAVENOUS at 15:44

## 2023-12-28 RX ADMIN — ROPIVACAINE HYDROCHLORIDE 8 ML/HR: 2 INJECTION, SOLUTION EPIDURAL; INFILTRATION; PERINEURAL at 13:49

## 2023-12-28 RX ADMIN — Medication 16.65 ML/MIN: at 21:55

## 2023-12-28 RX ADMIN — Medication 2 MILLI-UNITS/MIN: at 08:16

## 2023-12-28 NOTE — ANESTHESIA PRE PROCEDURE
injection 5-40 mL  5-40 mL IntraVENous 2 times per day Belen Mariscal MD        sodium chloride flush 0.9 % injection 5-40 mL  5-40 mL IntraVENous PRN Mahad Mccormick MD        0.9 % sodium chloride infusion   IntraVENous PRN Belen Mariscal MD        oxytocin (PITOCIN) 30 units in 500 mL infusion  1-30 brandi-units/min IntraVENous Continuous Belen Mariscal MD 30 mL/hr at 12/28/23 1547 30 brandi-units/min at 12/28/23 1547    methylergonovine (METHERGINE) injection 200 mcg  200 mcg IntraMUSCular PRN Belen Mariscal MD        miSOPROStol (CYTOTEC) tablet 200 mcg  200 mcg Buccal PRN Belen Mariscal MD        tranexamic acid-NaCl IVPB premix 1,000 mg  1,000 mg IntraVENous Once PRN Belen Mariscal MD        terbutaline (BRETHINE) injection 0.25 mg  0.25 mg SubCUTAneous Once Belen Mariscal MD        ondansetron [facility]) injection 4 mg  4 mg IntraVENous Q6H PRN Belen Mariscal MD        oxytocin (PITOCIN) 30 units in 500 mL infusion  87.3 brandi-units/min IntraVENous Continuous PRN Belen Mariscal MD        And    oxytocin (PITOCIN) 10 unit bolus from the bag  999 brandi-units/min IntraVENous PRN Belen Mariscal MD         Facility-Administered Medications Ordered in Other Encounters   Medication Dose Route Frequency Provider Last Rate Last Admin    ROPivacaine (NAROPIN) 0.2% injection 0.2%   Epidural PRN BRENTON De La Torre - CRNA   8 mL/hr at 12/28/23 1349       Allergies: Allergies   Allergen Reactions    Cefzil [Cefprozil] Hives       Problem List:    Patient Active Problem List   Diagnosis Code    Multigravida in third trimester Z34.83    History of macrosomia in infant in prior pregnancy, currently pregnant in third trimester Q83.274    Anemia complicating pregnancy, third trimester O99.013    Diet controlled gestational diabetes mellitus (GDM) in third trimester O22.18    Breech presentation of fetus O32. 1XX0       Past Medical

## 2023-12-28 NOTE — PROGRESS NOTES
Elsy Cheek at bedside at [phone]. CLARA Braden at bedside at [phone]    Assisted pt to sitting up on bedside at 1339. Timeout completed at [phone] with MD, CLARA and myself at bedside. Test dose given at 1346. Negative reaction. Dose given at 1349  . Pt assisted to lying back in right tilt position. See anesthesia record for details. See vital sign flow sheet for BP. Tolerated procedure well.

## 2023-12-28 NOTE — H&P
[facility]  [place], [address], [address] [facility]  [phone]    Marylee Leach, MD, Arleen Polanco [facility]  Edu Sandy MD, [address] [facility] H&P      Assessment/Plan    Patient Active Problem List    Diagnosis Date Noted    Breech presentation of fetus 11/27/2023     Overview Note:     11/27/23:  EFW 17%, AC 14%, ELIN 9.1 cm, BREECH  12/11/2023: vertex      Anemia complicating pregnancy, third trimester 10/13/2023     Overview Note:     Add'l Fe    12/12/23: Hgb 11.3      Diet controlled gestational diabetes mellitus (GDM) in third trimester 10/13/2023     Overview Note:     Failed 1 hr glucola, 3 hr FAILED    10/26/2023: BS log reviewed, mostly all within target. Pt has not yet scheduled diet teaching appt, advised to call and schedule. EFW 22%, AC 24%, ELIN nl, BREECH  11/27/23:  EFW 17%, AC 14%, ELIN 9.1 cm, BREECH  12/11/23:  EFW 15%, AC 13%, ELIN 11.5 cm, vtx  12/18/2023: log reviewed-all within target range      History of macrosomia in infant in prior pregnancy, currently pregnant in third trimester 06/21/2023     Overview Note:     G1 9lb 1 oz    10/26/2023: JANUARY signed for records today. Pt reports episiotomy/vacuum with G1, unsure if any shoulder dystocia was present. 11/14/2023: records received from delivery 2019. VAVD with RML episiotomy. No evidence of shoulder dystocia. 11/27/23:  EFW 17%, AC 14%, ELIN 9.1 cm, BREECH  12/11/23:  EFW 15%, AC 13%, ELIN 11.5 cm, vtx      Multigravida in third trimester 05/31/2023     Overview Note:     EDC by 9 0/7 week US not C/W LMP    /6/26/23: NIPT low risk, FEMALE, CF, SMA, DMD Fragile X all neg  7/18/23:  AFP only neg  10/12/23: tdap today  11/27/23:  EFW 17%, AC 14%, ELIN 9.1 cm, BREECH  12/11/23:  EFW 15%, AC 13%, ELIN 11.5 cm, vtx          Subjective    Tino Meals 29 y.o. [id number] 39w1d  presented to L&D for IOL for GDM. Pt has no complaints today. Pt denies vaginal bleeding/discharge. No LOF.  +FM.       OB History

## 2023-12-28 NOTE — ANESTHESIA PROCEDURE NOTES
Epidural Block    Patient location during procedure: OB  Start time: 12/28/2023 1:40 PM  End time: 12/28/2023 1:44 PM  Reason for block: labor epidural  Staffing  Performed: anesthesiologist   Anesthesiologist: Janee Monroy MD  Performed by: Janee Monroy MD  Authorized by: Janee Monroy MD    Epidural  Patient position: sitting  Prep: ChloraPrep  Patient monitoring: continuous pulse ox and frequent blood pressure checks  Approach: midline  Location: L3-4  Injection technique: BILLY saline  Provider prep: mask and sterile gloves  Needle  Needle type: Tuohy   Needle gauge: 17 G  Epidural needle length (in): 10 cm. Needle insertion depth: 5 cm  Catheter type: end hole  Catheter size: 19 G  Catheter at skin depth: 9 cm  Test dose: negativeCatheter Secured: tegaderm and tape (liquid adhesive)  Assessment  Hemodynamics: stable  Attempts: 1  Outcomes: patient tolerated procedure well and uncomplicated  Additional Notes  3 cc 1% lidocaine local at needle insertion site.     Risks discussed including damage to muscle or nerve, post-procedure headache, bleeding, infection  Preanesthetic Checklist  Completed: patient identified, IV checked, risks and benefits discussed, equipment checked, pre-op evaluation, timeout performed, anesthesia consent given, oxygen available and monitors applied/VS acknowledged

## 2023-12-29 PROCEDURE — 1100000000 HC RM PRIVATE

## 2023-12-29 PROCEDURE — 6360000002 HC RX W HCPCS: Performed by: OBSTETRICS & GYNECOLOGY

## 2023-12-29 PROCEDURE — 2580000003 HC RX 258: Performed by: OBSTETRICS & GYNECOLOGY

## 2023-12-29 PROCEDURE — 6370000000 HC RX 637 (ALT 250 FOR IP): Performed by: OBSTETRICS & GYNECOLOGY

## 2023-12-29 RX ORDER — SODIUM CHLORIDE 0.9 % (FLUSH) 0.9 %
5-40 SYRINGE (ML) INJECTION PRN
Status: DISCONTINUED | OUTPATIENT
Start: 2023-12-29 | End: 2023-12-30 | Stop reason: HOSPADM

## 2023-12-29 RX ORDER — LANOLIN
CREAM (ML) TOPICAL PRN
Status: DISCONTINUED | OUTPATIENT
Start: 2023-12-29 | End: 2023-12-30 | Stop reason: HOSPADM

## 2023-12-29 RX ORDER — SODIUM CHLORIDE 0.9 % (FLUSH) 0.9 %
5-40 SYRINGE (ML) INJECTION EVERY 12 HOURS SCHEDULED
Status: DISCONTINUED | OUTPATIENT
Start: 2023-12-29 | End: 2023-12-29 | Stop reason: ALTCHOICE

## 2023-12-29 RX ORDER — SODIUM CHLORIDE 9 MG/ML
INJECTION, SOLUTION INTRAVENOUS PRN
Status: DISCONTINUED | OUTPATIENT
Start: 2023-12-29 | End: 2023-12-30 | Stop reason: HOSPADM

## 2023-12-29 RX ORDER — ACETAMINOPHEN 500 MG
1000 TABLET ORAL EVERY 8 HOURS PRN
Status: DISCONTINUED | OUTPATIENT
Start: 2023-12-29 | End: 2023-12-30 | Stop reason: HOSPADM

## 2023-12-29 RX ORDER — IBUPROFEN 600 MG/1
600 TABLET ORAL EVERY 6 HOURS
Status: DISCONTINUED | OUTPATIENT
Start: 2023-12-29 | End: 2023-12-30 | Stop reason: HOSPADM

## 2023-12-29 RX ORDER — MISOPROSTOL 200 UG/1
200 TABLET ORAL EVERY 6 HOURS PRN
Status: DISCONTINUED | OUTPATIENT
Start: 2023-12-29 | End: 2023-12-30 | Stop reason: HOSPADM

## 2023-12-29 RX ORDER — DOCUSATE SODIUM 100 MG/1
100 CAPSULE, LIQUID FILLED ORAL 2 TIMES DAILY
Status: DISCONTINUED | OUTPATIENT
Start: 2023-12-29 | End: 2023-12-30 | Stop reason: HOSPADM

## 2023-12-29 RX ORDER — SODIUM CHLORIDE, SODIUM LACTATE, POTASSIUM CHLORIDE, CALCIUM CHLORIDE 600; 310; 30; 20 MG/100ML; MG/100ML; MG/100ML; MG/100ML
INJECTION, SOLUTION INTRAVENOUS CONTINUOUS
Status: DISCONTINUED | OUTPATIENT
Start: 2023-12-29 | End: 2023-12-30 | Stop reason: HOSPADM

## 2023-12-29 RX ORDER — OXYCODONE HYDROCHLORIDE 5 MG/1
5 TABLET ORAL EVERY 4 HOURS PRN
Status: DISCONTINUED | OUTPATIENT
Start: 2023-12-29 | End: 2023-12-30 | Stop reason: HOSPADM

## 2023-12-29 RX ORDER — METHYLERGONOVINE MALEATE 0.2 MG/ML
200 INJECTION INTRAVENOUS PRN
Status: DISCONTINUED | OUTPATIENT
Start: 2023-12-29 | End: 2023-12-30 | Stop reason: HOSPADM

## 2023-12-29 RX ADMIN — ACETAMINOPHEN 1000 MG: 500 TABLET, FILM COATED ORAL at 09:09

## 2023-12-29 RX ADMIN — SODIUM CHLORIDE, PRESERVATIVE FREE 10 ML: 5 INJECTION INTRAVENOUS at 01:23

## 2023-12-29 RX ADMIN — Medication: at 01:23

## 2023-12-29 RX ADMIN — ACETAMINOPHEN 1000 MG: 500 TABLET, FILM COATED ORAL at 21:05

## 2023-12-29 RX ADMIN — ONDANSETRON 4 MG: 2 INJECTION INTRAMUSCULAR; INTRAVENOUS at 00:34

## 2023-12-29 RX ADMIN — IBUPROFEN 600 MG: 600 TABLET, FILM COATED ORAL at 12:20

## 2023-12-29 RX ADMIN — DOCUSATE SODIUM 100 MG: 100 CAPSULE, LIQUID FILLED ORAL at 21:05

## 2023-12-29 RX ADMIN — ACETAMINOPHEN 1000 MG: 500 TABLET, FILM COATED ORAL at 01:23

## 2023-12-29 RX ADMIN — DOCUSATE SODIUM 100 MG: 100 CAPSULE, LIQUID FILLED ORAL at 09:09

## 2023-12-29 RX ADMIN — IBUPROFEN 600 MG: 600 TABLET, FILM COATED ORAL at 17:22

## 2023-12-29 ASSESSMENT — PAIN SCALES - GENERAL: PAINLEVEL_OUTOF10: 0

## 2023-12-29 NOTE — LACTATION NOTE
This note was copied from a baby's chart. Experienced mom reports baby breastfeeding well. Noted stridor by neonatologist.  No problems or questions. Encouraged frequent feeding. Watch output. Reviewed breastfeeding packet. Offered to visualize latch prior to discharge. Mom declined, will call out if assistance desired.

## 2023-12-29 NOTE — PROGRESS NOTES
Shift assessment complete see flowsheet. Discussed today plan of care with pt. Bleeding precautions given. No s/s of distress noted at this time. Questions encouraged and answered. Pt in bed with all light in reach.

## 2023-12-29 NOTE — L&D DELIVERY NOTE
[facility], [address] [facility], [address] [place] [address]  Sagrario Bautista MD, Sarah Eli, NP-BC  Marvin Chambers MD, FACOG  Delivery Note    Present for entire delivery. Details in delivery summary. . Viable female infant, APGARS 8,9 .   Weight - 6 lbs, 9 oz. EBL:  200 mL  Pain mgmt:   epidural    Nuchal cord x 1, delivered through and reduced after delivery without difficulty    Placenta spont, intact, 3VC. 2nd degree midline perineal and bilateral periurethral lacerations all repaired with 3-0 vicryl rapide    Pt and infant stable.       Darin Westbrook MD   9:57 PM  23

## 2023-12-29 NOTE — L&D DELIVERY NOTE
Sumaya Worley, Girl Jelly Gandara [[id number]]      Labor Events     Labor: No   Steroids: None  Cervical Ripening Date/Time:      Antibiotics Received during Labor: No  Rupture Date/Time:  23 11:24:00   Rupture Type: AROM  Fluid Color: Clear  Induction: Oxytocin, AROM              Anesthesia    Method: Epidural       Labor Event Times      Labor onset date/time:        Dilation complete date/time:  23 21:20:00     Start pushing date/time:  2023 21:23:00   Decision date/time (emergent ):            Delivery Details      Delivery Date: 23 Delivery Time: 21:41:37   Delivery Type: Vaginal, Spontaneous              Silva Presentation    Presentation: Vertex  Position: Left  _: Occiput  _: Anterior       Shoulder Dystocia    Shoulder Dystocia Present?: No       Assisted Delivery Details    Forceps Attempted?: No  Vacuum Extractor Attempted?: No                           Cord    Vessels: 3 Vessels  Complications: None  Delayed Cord Clamping?: Yes  Cord Clamped Date/Time: 2023 21:42:00  Cord Blood Disposition: Lab  Gases Sent?: No              Placenta    Date/Time: 2023 21:45:21  Removal: Spontaneous  Appearance: Intact  Disposition: Discarded       Lacerations    Episiotomy: None  Perineal Lacerations: 2nd  Other Lacerations: periurethral laceration  Periurethral Laceration: Bilateral Repaired?: Yes   Number of Repair Packets: 2       Vaginal Counts    Initial Count Personnel: SB  Initial Count Verified By: DOMINGUEZ  Intial Sponge Count: Correct Intial Needles Count: Correct Intial Instruments Count: Correct   Final Count Personnel: SB  Final Count Verified By: Jad Downy       Blood Loss  Mother: Alexandro Engel #[id number]     Start of Mother's Information      Delivery Blood Loss  23 0941 - 23 2157      None                 End of Mother's Information  Mother: Alexandro Engel #[id number]                Delivery Providers    Delivering clinician: Rachid Arango

## 2023-12-29 NOTE — CARE COORDINATION
Chart reviewed - no needs identified. SW met with patient to complete initial assessment. Patient denies any history of postpartum depression/anxiety. Patient given informational packet on  mood & anxiety disorders (resources/education). Family denies any additional needs from  at this time. Family has 's contact information should any needs/questions arise.     SHEEBA Membreno-JORY, [address] [place] [address]   [phone]

## 2023-12-29 NOTE — ANESTHESIA POSTPROCEDURE EVALUATION
Department of Anesthesiology  Postprocedure Note    Patient: Jairo Mello  MRN: [id number]  YOB: 1990  Date of evaluation: 12/29/2023    Procedure Summary       Date: 12/28/23 Room / Location:     Anesthesia Start: 8669 Anesthesia Stop: 2141    Procedure: Labor Analgesia Diagnosis:     Scheduled Providers:  Responsible Provider: Saji Marshall MD    Anesthesia Type: epidural ASA Status: 2            Anesthesia Type: No value filed. Nicole Phase I:      Nicole Phase II:      Anesthesia Post Evaluation    Patient location during evaluation: floor  Patient participation: complete - patient participated  Level of consciousness: awake  Airway patency: patent  Nausea & Vomiting: no nausea and no vomiting  Cardiovascular status: blood pressure returned to baseline  Respiratory status: acceptable  Hydration status: euvolemic  Pain management: adequate    No notable events documented.

## 2023-12-30 VITALS
HEART RATE: 70 BPM | SYSTOLIC BLOOD PRESSURE: 103 MMHG | RESPIRATION RATE: 17 BRPM | TEMPERATURE: 97.4 F | DIASTOLIC BLOOD PRESSURE: 52 MMHG | OXYGEN SATURATION: 97 %

## 2023-12-30 PROCEDURE — 6370000000 HC RX 637 (ALT 250 FOR IP): Performed by: OBSTETRICS & GYNECOLOGY

## 2023-12-30 RX ORDER — IBUPROFEN 800 MG/1
800 TABLET ORAL EVERY 8 HOURS PRN
Qty: 60 TABLET | Refills: 0 | Status: SHIPPED | OUTPATIENT
Start: 2023-12-30

## 2023-12-30 RX ADMIN — ACETAMINOPHEN 1000 MG: 500 TABLET, FILM COATED ORAL at 05:05

## 2023-12-30 RX ADMIN — IBUPROFEN 600 MG: 600 TABLET, FILM COATED ORAL at 00:25

## 2023-12-30 RX ADMIN — DOCUSATE SODIUM 100 MG: 100 CAPSULE, LIQUID FILLED ORAL at 08:26

## 2023-12-30 RX ADMIN — IBUPROFEN 600 MG: 600 TABLET, FILM COATED ORAL at 06:06

## 2023-12-30 NOTE — DISCHARGE INSTRUCTIONS
After Your Delivery (the Postpartum Period): Care Instructions  Overview     Congratulations on the birth of your baby. Like pregnancy, the  period can be a time of excitement, pauline, and exhaustion. You may look at your wondrous little baby and feel happy. You may also be overwhelmed by your new sleep hours and new responsibilities.  At first, babies often sleep during the days and are awake at night. They do not have a pattern or routine. They may make sudden gasps, jerk themselves awake, or look like they have crossed eyes. These are all normal, and they may even make you smile.  In these first weeks after delivery, try to take good care of yourself. It may take 4 to 6 weeks to feel like yourself again, and possibly longer if you had a  birth. You will likely feel very tired for several weeks. Your days will be full of ups and downs, but lots of pauline as well.  Follow-up care is a key part of your treatment and safety. Be sure to make and go to all appointments, and call your doctor if you are having problems. It's also a good idea to know your test results and keep a list of the medicines you take.  How can you care for yourself at home?  Take care of your body after delivery  Use pads instead of tampons for the bloody flow that may last as long as 2 weeks.  Ease cramps with ibuprofen (Advil, Motrin).  Ease soreness of hemorrhoids and the area between your vagina and rectum with ice compresses or witch hazel pads.  Ease constipation by drinking lots of fluid and eating high-fiber foods. Ask your doctor about over-the-counter stool softeners.  Cleanse yourself with a gentle squeeze of warm water from a bottle instead of wiping with toilet paper.  Take a sitz bath in warm water several times a day.  Wear a good nursing bra. Ease sore and swollen breasts with warm, wet washcloths.  If you aren't breastfeeding, use ice rather than heat for breast soreness.  Your period may not start for several  healthcare professional. [address] any warranty or liability for your use of this information.

## 2023-12-30 NOTE — PROGRESS NOTES
Shift assessment complete see flowsheet. Discussed today plan of care with pt. Bleeding precautions given. No s/s of distress noted at this time. Pt to call with needs/concerns. Pt in bed with call light in reach.

## 2023-12-30 NOTE — LACTATION NOTE
This note was copied from a baby's chart. In to follow up with mom and infant. Experienced mom stated that infant continues to latch and nurse well and she has no questions or concerns. Mom to follow up with lactation consultant as needed.

## 2024-02-06 ENCOUNTER — POSTPARTUM VISIT (OUTPATIENT)
Dept: OBGYN CLINIC | Age: 34
End: 2024-02-06
Payer: COMMERCIAL

## 2024-02-06 VITALS
DIASTOLIC BLOOD PRESSURE: 64 MMHG | HEIGHT: 64 IN | BODY MASS INDEX: 29.19 KG/M2 | WEIGHT: 171 LBS | SYSTOLIC BLOOD PRESSURE: 116 MMHG

## 2024-02-06 DIAGNOSIS — Z86.32 HISTORY OF DIET CONTROLLED GESTATIONAL DIABETES MELLITUS (GDM): Primary | ICD-10-CM

## 2024-02-06 DIAGNOSIS — Z76.89 ENCOUNTER TO ESTABLISH CARE: ICD-10-CM

## 2024-02-06 DIAGNOSIS — Z12.4 PAP SMEAR FOR CERVICAL CANCER SCREENING: ICD-10-CM

## 2024-02-06 PROBLEM — Z34.83 MULTIGRAVIDA IN THIRD TRIMESTER: Status: RESOLVED | Noted: 2023-05-31 | Resolved: 2024-02-06

## 2024-02-06 PROBLEM — O24.410 DIET CONTROLLED GESTATIONAL DIABETES MELLITUS (GDM) IN THIRD TRIMESTER: Status: RESOLVED | Noted: 2023-10-13 | Resolved: 2024-02-06

## 2024-02-06 PROBLEM — O99.013 ANEMIA COMPLICATING PREGNANCY, THIRD TRIMESTER: Status: RESOLVED | Noted: 2023-10-13 | Resolved: 2024-02-06

## 2024-02-06 PROBLEM — O09.293 HISTORY OF MACROSOMIA IN INFANT IN PRIOR PREGNANCY, CURRENTLY PREGNANT IN THIRD TRIMESTER: Status: RESOLVED | Noted: 2023-06-21 | Resolved: 2024-02-06

## 2024-02-06 ASSESSMENT — PATIENT HEALTH QUESTIONNAIRE - PHQ9
SUM OF ALL RESPONSES TO PHQ9 QUESTIONS 1 & 2: 0
SUM OF ALL RESPONSES TO PHQ QUESTIONS 1-9: 0
SUM OF ALL RESPONSES TO PHQ QUESTIONS 1-9: 0
1. LITTLE INTEREST OR PLEASURE IN DOING THINGS: 0
SUM OF ALL RESPONSES TO PHQ QUESTIONS 1-9: 0
SUM OF ALL RESPONSES TO PHQ QUESTIONS 1-9: 0
2. FEELING DOWN, DEPRESSED OR HOPELESS: 0

## 2024-02-06 NOTE — PROGRESS NOTES
Patient comes in today for 6 week post partum visit. Declines wanting BC at this time.     Delivery Method: Vaginal     Delivery Date: 12/28/23    Birth Control: none    Breast/Bottle: breastfeeding     Bleeding: none-scant    Baby: Doing well    Bowel/Bladder: No issues    Blues: None    Last pap smear:  approx 1-2 years ago    Vitals:    02/06/24 1503   BP: 116/64        NEGRITA RAYA RN  3:14 PM  02/06/24

## 2024-02-06 NOTE — PROGRESS NOTES
HPI:    Tricia Iyer is a 33 y.o.  who is here for her 6 week postpartum visit.          Date of Delivery:2024    Feeding: bottle    Birth Control: none    Vaginal Bleeding lighter than menses. Denies abdominal pain    Denies dysuria, urgency or frequency    Denies constipation or diarrhea    Denies fever, chills, CP, SOB, cough, or leg pain or swelling.    Incision: Denies redness, swelling, drainage or severe pain    Reports good moods        PE:    Constitutional:  well-developed, well-nourished, and in no distress.     Mental: Alert and awake. Oriented to person/place/time. Able to follow commands    Eyes: EOM nl, Sclera nl, Ocular Discharge not visualized    HENT: Normocephalic and atraumatic. Mouth/Throat: Mucous membranes are moist. External Ears: nl    Neck: Normal range of motion. Neck supple. No thyromegaly present.     Pulmonary: Effort normal; No visualized signs of difficulty breathing or respiratory distress;     Abdominal: Soft. There is no tenderness. There is no rebound.     Pelvic Exam:       External: normal female genitalia without lesions or masses       Vagina: normal without lesions or masses, scant brown physiologic discharge noted      Cervix: normal without lesions or masses       Adnexa: normal bimanual exam without masses or fullness       Uterus: uterus is normal size, shape, consistency and nontender    Musculoskeletal: Normal range of motion. Normal gait with no signs of ataxia     Neurological: No Facial Asymmetry (Cranial nerve 7 motor function); No gaze palsy; normal coordination, muscle strength and tone      Skin: Skin is warm and dry. Skin: No significant exanthematous lesions or discoloration noted on facial skin      Psych: Normal affect. No hallucinations          Past Medical History:   Diagnosis Date    Gestational diabetes mellitus        Past Surgical History:   Procedure Laterality Date    BUNIONECTOMY Left        Family History   Problem Relation Age

## 2024-02-06 NOTE — PROGRESS NOTES
Chaperone for Intimate Exam     Chaperone was offer accepted as part of the rooming process    Chaperone: Theresa Rodriguez

## 2024-02-07 NOTE — PROGRESS NOTES
I have reviewed the patient's visit today including history, exam and assessment by TORSTEN Avalos.  I agree with treatment/plan as above.    Santo Glass MD  7:52 AM  02/07/24

## 2024-02-12 LAB
COLLECTION METHOD: NORMAL
CYTOLOGIST CVX/VAG CYTO: NORMAL
CYTOLOGY CVX/VAG DOC THIN PREP: NORMAL
HPV APTIMA: NEGATIVE
HPV GENOTYPE REFLEX: NORMAL
Lab: NORMAL
PAP SOURCE: NORMAL
PATH REPORT.FINAL DX SPEC: NORMAL
PREV TREATMENT: NORMAL
STAT OF ADQ CVX/VAG CYTO-IMP: NORMAL

## 2024-02-14 ENCOUNTER — TELEPHONE (OUTPATIENT)
Dept: OBGYN CLINIC | Age: 34
End: 2024-02-14

## 2024-02-14 RX ORDER — DROSPIRENONE 4 MG/1
1 TABLET, FILM COATED ORAL DAILY
Qty: 84 TABLET | Refills: 3 | Status: SHIPPED | OUTPATIENT
Start: 2024-02-14

## 2024-02-14 NOTE — TELEPHONE ENCOUNTER
I sent prescription for slynd. Please let pt know will likely need PA (she has failed tx in the past with norethindrone mini pill). She can come  samples if she would like until PA approved

## 2024-03-01 ENCOUNTER — TELEPHONE (OUTPATIENT)
Dept: OBGYN CLINIC | Age: 34
End: 2024-03-01

## 2024-03-01 NOTE — TELEPHONE ENCOUNTER
Erroneous encounter     ----- Message from Tricia Iyer sent at 2/28/2024  9:20 AM EST -----  Regarding: Birth control  Contact: [phone]  We can just try the norethedrone again, that's good. Thank you

## 2024-03-04 ENCOUNTER — TELEPHONE (OUTPATIENT)
Dept: OBGYN CLINIC | Age: 34
End: 2024-03-04

## 2024-03-04 RX ORDER — ACETAMINOPHEN AND CODEINE PHOSPHATE 120; 12 MG/5ML; MG/5ML
1 SOLUTION ORAL DAILY
Qty: 84 TABLET | Refills: 3 | Status: SHIPPED | OUTPATIENT
Start: 2024-03-04

## 2024-03-04 NOTE — TELEPHONE ENCOUNTER
Pt would like Norethindrone due to Slynd not being approved by insurance. Gave pt option to appeal and gave her option of sending to [facility] and pt declined.     Please advise

## 2024-03-04 NOTE — TELEPHONE ENCOUNTER
----- Message from Tricia Iyer sent at 3/1/2024  2:07 PM EST -----  Regarding: Birth control  Contact: [phone]  No it's okay, you can just send the other one to my pharmacy please. Thank you

## 2024-03-11 NOTE — PROGRESS NOTES
Chief Complaint   Patient presents with    Routine Prenatal Visit        This 29 y.o. [id number] at 38w6d with Estimated Date of Delivery: 1/3/24 presents for routine prenatal visit. Patient has no complaints today. Pt reports good FM, no LOF, VB, ctx. Pt denies H/A, vision changes, abdom pain, N/V. Vitals:    12/26/23 0930   BP: 112/78   Site: Left Upper Arm   Position: Sitting   Weight: 83 kg (183 lb)   Height: 1.626 m (5' 4\")        Patient Active Problem List    Diagnosis Date Noted    Breech presentation of fetus 11/27/2023     Overview Note:     11/27/23:  EFW 17%, AC 14%, ELIN 9.1 cm, BREECH  12/11/2023: vertex       Assessment & Plan Note:     noted      Anemia complicating pregnancy, third trimester 10/13/2023     Overview Note:     Add'l Fe    12/12/23: Hgb 11.3       Assessment & Plan Note:     noted      Diet controlled gestational diabetes mellitus (GDM) in third trimester 10/13/2023     Overview Note:     Failed 1 hr glucola, 3 hr FAILED    10/26/2023: BS log reviewed, mostly all within target. Pt has not yet scheduled diet teaching appt, advised to call and schedule. EFW 22%, AC 24%, ELIN nl, BREECH  11/27/23:  EFW 17%, AC 14%, ELIN 9.1 cm, BREECH  12/11/23:  EFW 15%, AC 13%, ELIN 11.5 cm, vtx  12/18/2023: log reviewed-all within target range       Assessment & Plan Note:     Log reviewed - excellent control      History of macrosomia in infant in prior pregnancy, currently pregnant in third trimester 06/21/2023     Overview Note:     G1 9lb 1 oz    10/26/2023: JANUARY signed for records today. Pt reports episiotomy/vacuum with G1, unsure if any shoulder dystocia was present. 11/14/2023: records received from delivery 2019. VAVD with RML episiotomy. No evidence of shoulder dystocia.    11/27/23:  EFW 17%, AC 14%, ELIN 9.1 cm, BREECH  12/11/23:  EFW 15%, AC 13%, ELIN 11.5 cm, vtx       Assessment & Plan Note:      noted         Multigravida in third trimester 05/31/2023     Overview Note:     EDC by 9 0/7 week
Patient comes in today for routine prenatal visit. No complaints/concerns today.      Fetal Movement: Yes  Contractions: No  Vaginal Bleeding: No  Leaking Fluid: No  GI/: No    Vitals:    12/26/23 0930   BP: 112/78   Site: Left Upper Arm   Position: Sitting   Weight: 83 kg (183 lb)   Height: 1.626 m (5' 4\")
See allergy section

## 2024-03-25 ENCOUNTER — NURSE ONLY (OUTPATIENT)
Dept: OBGYN CLINIC | Age: 34
End: 2024-03-25

## 2024-03-25 DIAGNOSIS — Z86.32 HISTORY OF DIET CONTROLLED GESTATIONAL DIABETES MELLITUS (GDM): ICD-10-CM

## 2024-03-26 LAB
EST. AVERAGE GLUCOSE BLD GHB EST-MCNC: 94 MG/DL
HBA1C MFR BLD: 4.9 % (ref 4.8–5.6)

## 2024-05-10 ENCOUNTER — OFFICE VISIT (OUTPATIENT)
Dept: PRIMARY CARE CLINIC | Facility: CLINIC | Age: 34
End: 2024-05-10
Payer: COMMERCIAL

## 2024-05-10 VITALS
BODY MASS INDEX: 29.19 KG/M2 | WEIGHT: 171 LBS | HEIGHT: 64 IN | HEART RATE: 65 BPM | OXYGEN SATURATION: 98 % | DIASTOLIC BLOOD PRESSURE: 80 MMHG | SYSTOLIC BLOOD PRESSURE: 110 MMHG

## 2024-05-10 DIAGNOSIS — M25.50 ARTHRALGIA, UNSPECIFIED JOINT: ICD-10-CM

## 2024-05-10 DIAGNOSIS — Z86.32 HISTORY OF DIET-CONTROLLED GESTATIONAL DIABETES MELLITUS: ICD-10-CM

## 2024-05-10 DIAGNOSIS — Z86.19 HISTORY OF LYME DISEASE: ICD-10-CM

## 2024-05-10 DIAGNOSIS — Z13.220 SCREENING FOR LIPID DISORDERS: ICD-10-CM

## 2024-05-10 DIAGNOSIS — E04.2 MULTIPLE THYROID NODULES: ICD-10-CM

## 2024-05-10 DIAGNOSIS — Z76.89 ENCOUNTER TO ESTABLISH CARE WITH NEW DOCTOR: Primary | ICD-10-CM

## 2024-05-10 DIAGNOSIS — B35.4 TINEA CORPORIS: ICD-10-CM

## 2024-05-10 DIAGNOSIS — D22.9 CHANGE IN MOLE: ICD-10-CM

## 2024-05-10 LAB
ALBUMIN SERPL-MCNC: 4.5 G/DL (ref 3.5–5)
ALBUMIN/GLOB SERPL: 1.8 (ref 1–1.9)
ALP SERPL-CCNC: 100 U/L (ref 35–104)
ALT SERPL-CCNC: 18 U/L (ref 12–65)
ANION GAP SERPL CALC-SCNC: 12 MMOL/L (ref 9–18)
AST SERPL-CCNC: 21 U/L (ref 15–37)
BASOPHILS # BLD: 0 K/UL (ref 0–0.2)
BASOPHILS NFR BLD: 1 % (ref 0–2)
BILIRUB SERPL-MCNC: 0.3 MG/DL (ref 0–1.2)
BUN SERPL-MCNC: 14 MG/DL (ref 6–23)
CALCIUM SERPL-MCNC: 9.5 MG/DL (ref 8.8–10.2)
CHLORIDE SERPL-SCNC: 107 MMOL/L (ref 98–107)
CHOLEST SERPL-MCNC: 210 MG/DL (ref 0–200)
CO2 SERPL-SCNC: 23 MMOL/L (ref 20–28)
CREAT SERPL-MCNC: 0.68 MG/DL (ref 0.6–1.1)
CRP SERPL HS-MCNC: 0.5 MG/L (ref 0–3)
DIFFERENTIAL METHOD BLD: ABNORMAL
EOSINOPHIL # BLD: 0.2 K/UL (ref 0–0.8)
EOSINOPHIL NFR BLD: 4 % (ref 0.5–7.8)
ERYTHROCYTE [DISTWIDTH] IN BLOOD BY AUTOMATED COUNT: 12.6 % (ref 11.9–14.6)
ERYTHROCYTE [SEDIMENTATION RATE] IN BLOOD: <1 MM/HR (ref 0–20)
EST. AVERAGE GLUCOSE BLD GHB EST-MCNC: 97 MG/DL
GLOBULIN SER CALC-MCNC: 2.5 G/DL (ref 2.3–3.5)
GLUCOSE SERPL-MCNC: 98 MG/DL (ref 70–99)
HBA1C MFR BLD: 5 % (ref 0–5.6)
HCT VFR BLD AUTO: 37.3 % (ref 35.8–46.3)
HDLC SERPL-MCNC: 49 MG/DL (ref 40–60)
HDLC SERPL: 4.3 (ref 0–5)
HGB BLD-MCNC: 11.9 G/DL (ref 11.7–15.4)
IMM GRANULOCYTES # BLD AUTO: 0 K/UL (ref 0–0.5)
IMM GRANULOCYTES NFR BLD AUTO: 0 % (ref 0–5)
LDLC SERPL CALC-MCNC: 143 MG/DL (ref 0–100)
LYMPHOCYTES # BLD: 1.5 K/UL (ref 0.5–4.6)
LYMPHOCYTES NFR BLD: 34 % (ref 13–44)
MCH RBC QN AUTO: 30.4 PG (ref 26.1–32.9)
MCHC RBC AUTO-ENTMCNC: 31.9 G/DL (ref 31.4–35)
MCV RBC AUTO: 95.2 FL (ref 82–102)
MONOCYTES # BLD: 0.3 K/UL (ref 0.1–1.3)
MONOCYTES NFR BLD: 6 % (ref 4–12)
NEUTS SEG # BLD: 2.3 K/UL (ref 1.7–8.2)
NEUTS SEG NFR BLD: 55 % (ref 43–78)
NRBC # BLD: 0 K/UL (ref 0–0.2)
PLATELET # BLD AUTO: 225 K/UL (ref 150–450)
PMV BLD AUTO: 11.5 FL (ref 9.4–12.3)
POTASSIUM SERPL-SCNC: 4 MMOL/L (ref 3.5–5.1)
PROT SERPL-MCNC: 6.9 G/DL (ref 6.3–8.2)
RBC # BLD AUTO: 3.92 M/UL (ref 4.05–5.2)
SODIUM SERPL-SCNC: 142 MMOL/L (ref 136–145)
T4 FREE SERPL-MCNC: 1.1 NG/DL (ref 0.9–1.7)
TRIGL SERPL-MCNC: 90 MG/DL (ref 0–150)
TSH, 3RD GENERATION: 0.76 UIU/ML (ref 0.27–4.2)
URATE SERPL-MCNC: 6.5 MG/DL (ref 2.5–7.1)
VLDLC SERPL CALC-MCNC: 18 MG/DL (ref 6–23)
WBC # BLD AUTO: 4.3 K/UL (ref 4.3–11.1)

## 2024-05-10 PROCEDURE — 99204 OFFICE O/P NEW MOD 45 MIN: CPT | Performed by: FAMILY MEDICINE

## 2024-05-10 RX ORDER — KETOCONAZOLE 20 MG/G
CREAM TOPICAL
Qty: 30 G | Refills: 1 | Status: SHIPPED | OUTPATIENT
Start: 2024-05-10

## 2024-05-10 RX ORDER — FLUCONAZOLE 150 MG/1
150 TABLET ORAL WEEKLY
Qty: 4 TABLET | Refills: 0 | Status: SHIPPED | OUTPATIENT
Start: 2024-05-10

## 2024-05-10 SDOH — ECONOMIC STABILITY: FOOD INSECURITY: WITHIN THE PAST 12 MONTHS, THE FOOD YOU BOUGHT JUST DIDN'T LAST AND YOU DIDN'T HAVE MONEY TO GET MORE.: NEVER TRUE

## 2024-05-10 SDOH — ECONOMIC STABILITY: INCOME INSECURITY: HOW HARD IS IT FOR YOU TO PAY FOR THE VERY BASICS LIKE FOOD, HOUSING, MEDICAL CARE, AND HEATING?: NOT HARD AT ALL

## 2024-05-10 SDOH — ECONOMIC STABILITY: FOOD INSECURITY: WITHIN THE PAST 12 MONTHS, YOU WORRIED THAT YOUR FOOD WOULD RUN OUT BEFORE YOU GOT MONEY TO BUY MORE.: NEVER TRUE

## 2024-05-10 ASSESSMENT — ENCOUNTER SYMPTOMS
NAUSEA: 0
DIARRHEA: 0
COUGH: 0
SHORTNESS OF BREATH: 0
VOMITING: 0
COLOR CHANGE: 1
ABDOMINAL PAIN: 0

## 2024-05-10 ASSESSMENT — PATIENT HEALTH QUESTIONNAIRE - PHQ9
2. FEELING DOWN, DEPRESSED OR HOPELESS: NOT AT ALL
SUM OF ALL RESPONSES TO PHQ QUESTIONS 1-9: 0
1. LITTLE INTEREST OR PLEASURE IN DOING THINGS: NOT AT ALL
SUM OF ALL RESPONSES TO PHQ9 QUESTIONS 1 & 2: 0

## 2024-05-10 NOTE — ASSESSMENT & PLAN NOTE
Patient with two months of joint pains, is 4 months postpartum and notes autoimmune history on maternal side of the family.  Will check autoimmune labs, as well as Lyme disease due to history.

## 2024-05-10 NOTE — PROGRESS NOTES
[facility]  Charley Aguayondar, DO  [address], [place] [address] [phone]          ASSESSMENT AND PLAN    Problem List Items Addressed This Visit          Endocrine    Multiple thyroid nodules      History multiple nodules on US 5 years ago, will repeat.         Relevant Orders    US HEAD NECK SOFT TISSUE THYROID    TSH    T4, Free       Musculoskeletal and Integument    Tinea corporis      Will treat with Diflucan weekly for four weeks and two months of topical Ketoconazole.         Relevant Medications    fluconazole (DIFLUCAN) 150 MG tablet    Other Relevant Orders    [facility]    Change in mole      Facial mole with changes in color and size, will refer to Dermatology.         Relevant Orders    [facility]       Other    Encounter to establish care with new doctor - Primary    Arthralgia     Patient with two months of joint pains, is 4 months postpartum and notes autoimmune history on maternal side of the family.  Will check autoimmune labs, as well as Lyme disease due to history.         Relevant Orders    TSH    T4, Free    Lipid Panel    JULIA, Direct, w/Reflex    Comprehensive Metabolic Panel    CBC with Auto Differential    Lyme Disease Total Antibody With Reflex to Immunoassay    Rheumatoid Factor    CCP Antibodies, IGG/IGA    High sensitivity CRP    Sedimentation Rate    Uric Acid    Hemoglobin A1C    History of diet-controlled gestational diabetes mellitus    Relevant Orders    Hemoglobin A1C     Other Visit Diagnoses       History of Lyme disease        Screening for lipid disorders        Relevant Orders    Lipid Panel             The diagnoses and plan were discussed with the patient, who verbalizes understanding and agrees with plan.  All questions answered.    Chief Complaint    Chief Complaint   Patient presents with    New Patient     Pt wanted to establish care.     Blood Work     Pt would like thyroid

## 2024-05-10 NOTE — PATIENT INSTRUCTIONS
IT WAS GREAT TO SEE YOU TODAY!    I WILL CONTACT YOU WITH THE RESULTS OF YOUR LABS.    PLEASE TAKE ALL MEDICATION AS DISCUSSED.    ~TAKE DIFLUCAN ONCE A WEEK FOR FOUR WEEKS.    ~USE THE TOPICAL KETOCONAZOLE DAILY FOR 2 MONTHS TO HELP WITH YOUR RASH.    I REFERRED YOU TO DERMATOLOGY, THEY WILL CALL YOU WITH AN APPOINTMENT.    DRINK LOTS OF WATER, TAKE MOTRIN OR TYLENOL IF NEEDED FOR JOINT PAINS, AND TRY TO EXERCISE TO PREVENT YOUR JOINTS FROM GETTING STIFF.    I WILL SEE YOU AGAIN IN 3 MONTHS BUT PLEASE CALL WITH CONCERNS [phone]

## 2024-05-12 LAB
ANA SER QL: NEGATIVE
LYME ANTIBODY: NEGATIVE

## 2024-05-13 LAB
CCP IGA+IGG SERPL IA-ACNC: 5 UNITS (ref 0–19)
RHEUMATOID FACT SER QL LA: NEGATIVE

## 2024-05-18 ENCOUNTER — PATIENT MESSAGE (OUTPATIENT)
Dept: PRIMARY CARE CLINIC | Facility: CLINIC | Age: 34
End: 2024-05-18

## 2024-05-20 NOTE — TELEPHONE ENCOUNTER
Virgie Golden MA 5/20/2024 8:47 AM EDT    Hello,    Would you be able to send in a different Dermatologist Referral. The office does not accept this patient's referral.     Thanks.       ----- Message -----  From: Tricia Iyer  Sent: 5/18/2024 12:38 PM EDT  To: *  Subject: Dermatologist referral     [facility]! The dermatologist that the referral was sent to called to say they don't take my insurance, so I wanted to see If the referral could be sent to another dermatologist. Thank you!

## 2024-05-21 ENCOUNTER — HOSPITAL ENCOUNTER (OUTPATIENT)
Dept: ULTRASOUND IMAGING | Age: 34
Discharge: HOME | End: 2024-05-24
Attending: FAMILY MEDICINE
Payer: COMMERCIAL

## 2024-05-21 DIAGNOSIS — E04.2 MULTIPLE THYROID NODULES: ICD-10-CM

## 2024-05-21 PROCEDURE — 76536 US EXAM OF HEAD AND NECK: CPT

## 2024-08-12 ENCOUNTER — HOSPITAL ENCOUNTER (OUTPATIENT)
Dept: GENERAL RADIOLOGY | Age: 34
Discharge: HOME | End: 2024-08-15
Payer: COMMERCIAL

## 2024-08-12 ENCOUNTER — OFFICE VISIT (OUTPATIENT)
Dept: PRIMARY CARE CLINIC | Facility: CLINIC | Age: 34
End: 2024-08-12
Payer: COMMERCIAL

## 2024-08-12 VITALS
WEIGHT: 172 LBS | BODY MASS INDEX: 29.37 KG/M2 | OXYGEN SATURATION: 99 % | SYSTOLIC BLOOD PRESSURE: 122 MMHG | TEMPERATURE: 98.2 F | DIASTOLIC BLOOD PRESSURE: 84 MMHG | HEIGHT: 64 IN | HEART RATE: 67 BPM

## 2024-08-12 DIAGNOSIS — M25.512 CHRONIC PAIN OF BOTH SHOULDERS: ICD-10-CM

## 2024-08-12 DIAGNOSIS — M54.2 CERVICALGIA: ICD-10-CM

## 2024-08-12 DIAGNOSIS — M25.562 CHRONIC PAIN OF BOTH KNEES: ICD-10-CM

## 2024-08-12 DIAGNOSIS — M25.511 CHRONIC PAIN OF BOTH SHOULDERS: ICD-10-CM

## 2024-08-12 DIAGNOSIS — R51.9 NONINTRACTABLE EPISODIC HEADACHE, UNSPECIFIED HEADACHE TYPE: ICD-10-CM

## 2024-08-12 DIAGNOSIS — M25.561 CHRONIC PAIN OF BOTH KNEES: ICD-10-CM

## 2024-08-12 DIAGNOSIS — G89.29 CHRONIC PAIN OF BOTH SHOULDERS: ICD-10-CM

## 2024-08-12 DIAGNOSIS — G89.29 CHRONIC PAIN OF BOTH KNEES: ICD-10-CM

## 2024-08-12 DIAGNOSIS — M54.2 CERVICALGIA: Primary | ICD-10-CM

## 2024-08-12 PROCEDURE — 73030 X-RAY EXAM OF SHOULDER: CPT

## 2024-08-12 PROCEDURE — 99214 OFFICE O/P EST MOD 30 MIN: CPT | Performed by: FAMILY MEDICINE

## 2024-08-12 PROCEDURE — 73562 X-RAY EXAM OF KNEE 3: CPT

## 2024-08-12 PROCEDURE — 72040 X-RAY EXAM NECK SPINE 2-3 VW: CPT

## 2024-08-12 RX ORDER — CYCLOBENZAPRINE HCL 5 MG
5 TABLET ORAL NIGHTLY PRN
Qty: 30 TABLET | Refills: 0 | Status: SHIPPED | OUTPATIENT
Start: 2024-08-12 | End: 2024-09-11

## 2024-08-12 ASSESSMENT — ENCOUNTER SYMPTOMS
VOMITING: 0
SHORTNESS OF BREATH: 0
DIARRHEA: 0
NAUSEA: 0
COUGH: 0
ABDOMINAL PAIN: 0

## 2024-08-12 ASSESSMENT — PATIENT HEALTH QUESTIONNAIRE - PHQ9
SUM OF ALL RESPONSES TO PHQ QUESTIONS 1-9: 0
1. LITTLE INTEREST OR PLEASURE IN DOING THINGS: NOT AT ALL
SUM OF ALL RESPONSES TO PHQ9 QUESTIONS 1 & 2: 0

## 2024-08-12 NOTE — PATIENT INSTRUCTIONS
IT WAS GREAT TO SEE YOU TODAY!    I WILL CONTACT YOU WITH THE RESULTS OF YOUR X-RAYS.    PLEASE TAKE ALL MEDICATION AS DISCUSSED.    ~TAKE THE IBUPROFEN TO HELP WITH PAIN.  YOU CAN ALSO TAKE THE CYCLOBENZAPRINE TO HELP WITH MUSCLE SPASMS.  USE A HEATING PAD.    I WILL REFER YOU TO PHYSICAL THERAPY TO HELP WITH YOUR TIGHTNESS AND STRETCHING.    I WILL SEE YOU AGAIN IN 6 MONTHS BUT PLEASE CALL WITH CONCERNS [phone]

## 2024-08-12 NOTE — ASSESSMENT & PLAN NOTE
Intermittent, usually lasts for a few seconds then resolves on its own.  Not as bad as her history of migraines.  Will evaluate with a C-spine X-ray and will have her take Ibuprofen with Cyclobenzaprine as needed.

## 2024-08-12 NOTE — ASSESSMENT & PLAN NOTE
Tight, muscle spasms noted.  Will evaluate with X-ray of her C-spine and refer to PT.  Discussed use of Ibuprofen with Cyclobenzaprine to help with spasms.

## 2024-08-12 NOTE — ASSESSMENT & PLAN NOTE
Chronic, feels tight.  Will evaluate with X-rays and will refer to PT.  Discussed use of Ibuprofen and Cyclobenzaprine.

## 2024-08-12 NOTE — ASSESSMENT & PLAN NOTE
Chronic, intermittent crepitus.  Will evaluate with X-rays and will refer to PT.  Discussed use of Ibuprofen and Cyclobenzaprine.

## 2024-08-12 NOTE — PROGRESS NOTES
TR1    Nodule location: Right lower pole  Size: 1.4 x 1.0 x 1.1 cm  Composition: Mixed cystic and solid (1)  Echogenicity: Hypoechoic (2)  Shape: Wider-than-tall (0)  Margin: Smooth (0)  Echogenic foci: None (0)  TI-RADS category: TR3      Nodule location: Left interpolar region  Size: 0.8 x 1.0 x 0.6 cm  Composition: Mixed cystic and solid (1)  Echogenicity: Hypoechoic (2)  Shape: Wider-than-tall (0)  Margin: Ill-defined (0)  Echogenic foci: None (0)  TI-RADS category: TR3      Nodule location: Left lower pole  Size: 1.8 x 1.2 x 0.9 cm  Composition: Mixed cystic and solid (1)  Echogenicity: Hypoechoic (2)  Shape: Wider-than-tall (0)  Margin: Ill-defined (0)  Echogenic foci: None (0)  TI-RADS category: TR3      Survey images of the neck demonstrate no evidence of lymphadenopathy.    Impression  1.  Bilateral thyroid nodules. TI-RADS 3. Does not meet size criteria for FNA.    TI-RADS CATEGORY: TR 3    Recommendations:  TR1: Benign, no FNA or follow-up  TR2: Nonsuspicious, no FNA or follow-up  TR3: Mildly suspicious, FNA if greater than or equal to 2.5 cm, follow at 1, 3,  and 5 years if greater than or equal to 1.5 cm.  TR4: Moderately suspicious, FNA if greater than or equal to 1.5 cm, follow-up 1,  2, 3, and 5 years if greater than or equal to 1 cm.  TR5: Highly suspicious, FNA if greater than or equal to 1 cm, follow annularly  for 5 years if greater than or equal to 0.5 cm.          Charley Glass DO  8:43 AM  08/12/24

## 2024-08-14 ENCOUNTER — PATIENT MESSAGE (OUTPATIENT)
Dept: PRIMARY CARE CLINIC | Facility: CLINIC | Age: 34
End: 2024-08-14

## 2024-08-14 RX ORDER — IBUPROFEN 800 MG/1
800 TABLET ORAL EVERY 8 HOURS PRN
Qty: 90 TABLET | Refills: 3 | Status: SHIPPED | OUTPATIENT
Start: 2024-08-14

## 2025-02-10 ENCOUNTER — OFFICE VISIT (OUTPATIENT)
Dept: OBGYN CLINIC | Age: 35
End: 2025-02-10
Payer: COMMERCIAL

## 2025-02-10 VITALS
WEIGHT: 169 LBS | HEIGHT: 64 IN | DIASTOLIC BLOOD PRESSURE: 66 MMHG | BODY MASS INDEX: 28.85 KG/M2 | SYSTOLIC BLOOD PRESSURE: 118 MMHG

## 2025-02-10 DIAGNOSIS — N92.0 MENORRHAGIA WITH REGULAR CYCLE: Primary | ICD-10-CM

## 2025-02-10 DIAGNOSIS — Z86.32 HISTORY OF DIET CONTROLLED GESTATIONAL DIABETES MELLITUS (GDM): ICD-10-CM

## 2025-02-10 DIAGNOSIS — Z01.419 WOMEN'S ANNUAL ROUTINE GYNECOLOGICAL EXAMINATION: ICD-10-CM

## 2025-02-10 DIAGNOSIS — N92.0 MENORRHAGIA WITH REGULAR CYCLE: ICD-10-CM

## 2025-02-10 DIAGNOSIS — E04.2 MULTIPLE THYROID NODULES: ICD-10-CM

## 2025-02-10 LAB
ALBUMIN SERPL-MCNC: 4.2 G/DL (ref 3.5–5)
ALBUMIN/GLOB SERPL: 1.4 (ref 1–1.9)
ALP SERPL-CCNC: 94 U/L (ref 35–104)
ALT SERPL-CCNC: 15 U/L (ref 8–45)
ANION GAP SERPL CALC-SCNC: 12 MMOL/L (ref 7–16)
AST SERPL-CCNC: 17 U/L (ref 15–37)
BILIRUB SERPL-MCNC: 0.3 MG/DL (ref 0–1.2)
BUN SERPL-MCNC: 8 MG/DL (ref 6–23)
CALCIUM SERPL-MCNC: 9.5 MG/DL (ref 8.8–10.2)
CHLORIDE SERPL-SCNC: 105 MMOL/L (ref 98–107)
CO2 SERPL-SCNC: 25 MMOL/L (ref 20–29)
CREAT SERPL-MCNC: 0.61 MG/DL (ref 0.6–1.1)
ERYTHROCYTE [DISTWIDTH] IN BLOOD BY AUTOMATED COUNT: 12.6 % (ref 11.9–14.6)
EST. AVERAGE GLUCOSE BLD GHB EST-MCNC: 103 MG/DL
GLOBULIN SER CALC-MCNC: 2.9 G/DL (ref 2.3–3.5)
GLUCOSE SERPL-MCNC: 99 MG/DL (ref 70–99)
HBA1C MFR BLD: 5.2 % (ref 0–5.6)
HCT VFR BLD AUTO: 35.7 % (ref 35.8–46.3)
HGB BLD-MCNC: 11.7 G/DL (ref 11.7–15.4)
MCH RBC QN AUTO: 29.9 PG (ref 26.1–32.9)
MCHC RBC AUTO-ENTMCNC: 32.8 G/DL (ref 31.4–35)
MCV RBC AUTO: 91.3 FL (ref 82–102)
NRBC # BLD: 0 K/UL (ref 0–0.2)
PLATELET # BLD AUTO: 228 K/UL (ref 150–450)
PMV BLD AUTO: 11.9 FL (ref 9.4–12.3)
POTASSIUM SERPL-SCNC: 4 MMOL/L (ref 3.5–5.1)
PROT SERPL-MCNC: 7.2 G/DL (ref 6.3–8.2)
RBC # BLD AUTO: 3.91 M/UL (ref 4.05–5.2)
SODIUM SERPL-SCNC: 141 MMOL/L (ref 136–145)
T4 FREE SERPL-MCNC: 1 NG/DL (ref 0.9–1.7)
TSH, 3RD GENERATION: 0.55 UIU/ML (ref 0.27–4.2)
WBC # BLD AUTO: 10.6 K/UL (ref 4.3–11.1)

## 2025-02-10 PROCEDURE — 99395 PREV VISIT EST AGE 18-39: CPT | Performed by: NURSE PRACTITIONER

## 2025-02-10 SDOH — ECONOMIC STABILITY: INCOME INSECURITY: IN THE LAST 12 MONTHS, WAS THERE A TIME WHEN YOU WERE NOT ABLE TO PAY THE MORTGAGE OR RENT ON TIME?: NO

## 2025-02-10 SDOH — ECONOMIC STABILITY: FOOD INSECURITY: WITHIN THE PAST 12 MONTHS, YOU WORRIED THAT YOUR FOOD WOULD RUN OUT BEFORE YOU GOT MONEY TO BUY MORE.: SOMETIMES TRUE

## 2025-02-10 SDOH — ECONOMIC STABILITY: FOOD INSECURITY: WITHIN THE PAST 12 MONTHS, THE FOOD YOU BOUGHT JUST DIDN'T LAST AND YOU DIDN'T HAVE MONEY TO GET MORE.: NEVER TRUE

## 2025-02-10 SDOH — ECONOMIC STABILITY: TRANSPORTATION INSECURITY
IN THE PAST 12 MONTHS, HAS LACK OF TRANSPORTATION KEPT YOU FROM MEETINGS, WORK, OR FROM GETTING THINGS NEEDED FOR DAILY LIVING?: NO

## 2025-02-10 SDOH — ECONOMIC STABILITY: TRANSPORTATION INSECURITY
IN THE PAST 12 MONTHS, HAS THE LACK OF TRANSPORTATION KEPT YOU FROM MEDICAL APPOINTMENTS OR FROM GETTING MEDICATIONS?: NO

## 2025-02-10 ASSESSMENT — PATIENT HEALTH QUESTIONNAIRE - PHQ9
2. FEELING DOWN, DEPRESSED OR HOPELESS: SEVERAL DAYS
SUM OF ALL RESPONSES TO PHQ QUESTIONS 1-9: 0
SUM OF ALL RESPONSES TO PHQ9 QUESTIONS 1 & 2: 2
SUM OF ALL RESPONSES TO PHQ QUESTIONS 1-9: 0
SUM OF ALL RESPONSES TO PHQ9 QUESTIONS 1 & 2: 0
1. LITTLE INTEREST OR PLEASURE IN DOING THINGS: NOT AT ALL
SUM OF ALL RESPONSES TO PHQ QUESTIONS 1-9: 0
1. LITTLE INTEREST OR PLEASURE IN DOING THINGS: SEVERAL DAYS
1. LITTLE INTEREST OR PLEASURE IN DOING THINGS: SEVERAL DAYS
SUM OF ALL RESPONSES TO PHQ9 QUESTIONS 1 & 2: 2
2. FEELING DOWN, DEPRESSED OR HOPELESS: SEVERAL DAYS
2. FEELING DOWN, DEPRESSED OR HOPELESS: NOT AT ALL
SUM OF ALL RESPONSES TO PHQ QUESTIONS 1-9: 2
SUM OF ALL RESPONSES TO PHQ QUESTIONS 1-9: 2
SUM OF ALL RESPONSES TO PHQ QUESTIONS 1-9: 0
SUM OF ALL RESPONSES TO PHQ QUESTIONS 1-9: 2
SUM OF ALL RESPONSES TO PHQ QUESTIONS 1-9: 2

## 2025-02-10 NOTE — PROGRESS NOTES
Patient here for AE.   Patient would like to discuss her heavy menses since delivery (12/28/2023). Patient was taking norethindrone BC but stopped taking 2-3 months ago as the BC did not alleviate heavy menses.     LAST PAP:  2/6/2024, neg., HPV neg.     LAST MAMMO:  never     LMP:  Patient's last menstrual period was 02/02/2025.    BIRTH CONTROL:  condoms     TOBACCO USE:  No    FAMILY HISTORY OF:   Breast Cancer:  No   Ovarian Cancer:  No   Uterine Cancer:  No   Colon Cancer:  No    Vitals:    02/10/25 0911   BP: 118/66   Site: Left Upper Arm   Position: Sitting   Weight: 76.7 kg (169 lb)   Height: 1.626 m (5' 4\")        NEGRITA RAYA RN  02/10/25  9:18 AM

## 2025-02-10 NOTE — PROGRESS NOTES
I have reviewed the patient's visit today including history, exam and assessment by TORSTEN Avalos.  I agree with treatment/plan as above.    Santo Glass MD  9:57 AM  02/10/25

## 2025-02-10 NOTE — PROGRESS NOTES
Tricia Iyer is a 34 y.o.  who is here for AE        Patient's last menstrual period was 2025.    Menses: since delivery, menses have been heavier. She was on slynd for 3 months and then norethindrone. Menses lasting 6-7 days. For one day (day 2 of menses) extremely heavy, passing tissues and clots. Denies severe pain, only normal cramps  Pt is still nursing approx 3x/day    Birth Control: condoms    Date of last Cervical Cancer screen (HPV or PAP): 2024 neg, HPV neg    Hx abnormal pap or STD:denies, declines std screening today    No breast cancer screening on file    Fam Hx of Breast, ovarian or uterine cancer:denies    Sexually Active:yes            ROS:    Breast: Denies pain, lump or nipple discharge    GYN: Denies pelvic pain, discharge, itching, odor or dysuria.     Constitutional: Negative for chills and fever.     HENT: Negative for severe headaches or vision changes    Respiratory: Negative for cough and shortness of breath.  congestion    Cardiovascular: Negative for chest pain and palpitations.     Gastrointestinal: Negative for nausea and vomiting. Negative for diarrhea and constipation    Genitourinary: Negative for dysuria and hematuria.           Past Medical History:   Diagnosis Date    Anxiety     Depression     Gestational diabetes mellitus        Past Surgical History:   Procedure Laterality Date    BUNIONECTOMY Left        Family History   Problem Relation Age of Onset    Anemia Mother     High Blood Pressure Mother     Other Mother         Thyroid    Alcohol Abuse Father     Diabetes Maternal Grandfather     Breast Cancer Neg Hx     Colon Cancer Neg Hx     Uterine Cancer Neg Hx     Ovarian Cancer Neg Hx        Social History     Socioeconomic History    Marital status: Single     Spouse name: Not on file    Number of children: Not on file    Years of education: Not on file    Highest education level: Not on file   Occupational History    Not on file   Tobacco Use

## 2025-02-10 NOTE — ASSESSMENT & PLAN NOTE
During 1 day of menses  No relief with mini pill or slynd  Plan labs and US  We discuss alternative hormonal birth control options, pt would like to wait until after US to decide  Using condoms for birth control

## 2025-02-13 ENCOUNTER — OFFICE VISIT (OUTPATIENT)
Dept: PRIMARY CARE CLINIC | Facility: CLINIC | Age: 35
End: 2025-02-13
Payer: COMMERCIAL

## 2025-02-13 VITALS
HEIGHT: 64 IN | DIASTOLIC BLOOD PRESSURE: 84 MMHG | OXYGEN SATURATION: 99 % | HEART RATE: 79 BPM | TEMPERATURE: 98.1 F | BODY MASS INDEX: 29.53 KG/M2 | WEIGHT: 173 LBS | SYSTOLIC BLOOD PRESSURE: 112 MMHG

## 2025-02-13 DIAGNOSIS — G43.009 MIGRAINE WITHOUT AURA AND WITHOUT STATUS MIGRAINOSUS, NOT INTRACTABLE: Primary | ICD-10-CM

## 2025-02-13 DIAGNOSIS — J01.90 ACUTE SINUSITIS, RECURRENCE NOT SPECIFIED, UNSPECIFIED LOCATION: ICD-10-CM

## 2025-02-13 DIAGNOSIS — M54.2 CERVICALGIA: ICD-10-CM

## 2025-02-13 DIAGNOSIS — E04.2 MULTIPLE THYROID NODULES: ICD-10-CM

## 2025-02-13 PROCEDURE — 99214 OFFICE O/P EST MOD 30 MIN: CPT | Performed by: FAMILY MEDICINE

## 2025-02-13 RX ORDER — RIZATRIPTAN BENZOATE 10 MG/1
10 TABLET ORAL
Qty: 9 TABLET | Refills: 11 | Status: SHIPPED | OUTPATIENT
Start: 2025-02-13 | End: 2025-02-13

## 2025-02-13 ASSESSMENT — ENCOUNTER SYMPTOMS
NAUSEA: 0
COUGH: 1
ABDOMINAL PAIN: 0
SHORTNESS OF BREATH: 0
DIARRHEA: 0
VOMITING: 0
SINUS PRESSURE: 1

## 2025-02-13 NOTE — ASSESSMENT & PLAN NOTE
Reviewed last TSH, which is slightly lower than before but still WNL.  Will recheck in six months.

## 2025-02-13 NOTE — ASSESSMENT & PLAN NOTE
Acute, unmanaged.  Notes 10 days of cough and congestion with green mucus.  Will treat with Augmentin.  Discussed use of OTC meds to help with cough and the importance of hydration.   [Follow-up Visit ___] : a follow-up visit  for [unfilled]

## 2025-02-13 NOTE — PATIENT INSTRUCTIONS
IT WAS GREAT TO SEE YOU TODAY!    USE A HEATING PAD TO HELP WITH YOUR NECK TENSION.    PLEASE TAKE ALL MEDICATION AS DISCUSSED.    ~TAKE THE AUGMENTIN TO HELP WITH YOUR SINUSES.    ~USE MUCINEX TO HELP WITH YOUR CONGESTION.    ~USE THE MAXALT WHEN YOU FEEL A HEADACHE START.  YOU CAN TAKE A SECOND ONE TWO HOURS LATER IF NEEDED.  IT MAY MAKE YOU SLEEPY    I WILL SEE YOU AGAIN IN 6 MONTHS BUT PLEASE CALL WITH CONCERNS [phone]

## 2025-02-13 NOTE — PROGRESS NOTES
[facility]  Charley Glass, DO  [address], [place] [address]  [phone]         ASSESSMENT AND PLAN    Problem List Items Addressed This Visit          Respiratory    Acute sinusitis     Acute, unmanaged.  Notes 10 days of cough and congestion with green mucus.  Will treat with Augmentin.  Discussed use of OTC meds to help with cough and the importance of hydration.         Relevant Medications    amoxicillin-clavulanate (AUGMENTIN) 875-125 MG per tablet       Endocrine    Multiple thyroid nodules     Reviewed last TSH, which is slightly lower than before but still WNL.  Will recheck in six months.            Nervous and Auditory    Migraine without aura and without status migrainosus, not intractable - Primary     Chronic, intermittent.  Had what sounds like an atypical migraine during her pregnancy over a year ago, has had intermittent sharp headaches since.  Denies noticing an aura or vision changes.  Will see if Maxalt at the onset of a headache helps. Also discussed use of Tylenol with a Coke or Excedrin.           Relevant Medications    rizatriptan (MAXALT) 10 MG tablet       Other    Cervicalgia     Chronic, improving with stretching.  Discussed use of a heating pad to help.             The diagnoses and plan were discussed with the patient, who verbalizes understanding and agrees with plan.  All questions answered.    Chief Complaint    Chief Complaint   Patient presents with    6 Month Follow-Up       HISTORY OF PRESENT ILLNESS    34 y.o. female presents today for follow up.  Last seen six months ago, referred to PT for neck spasms causing headaches.  Notes that she has had congestion for the last couple of weeks.  Daughter had bronchitis.  Denies fever or body aches.  Has been coughing up green mucus in the morning.  Notes that she is ok during the day but it gets bad again at night.  Notes that she is still breastfeeding sometimes.  Notes

## 2025-02-13 NOTE — ASSESSMENT & PLAN NOTE
Chronic, intermittent.  Had what sounds like an atypical migraine during her pregnancy over a year ago, has had intermittent sharp headaches since.  Denies noticing an aura or vision changes.  Will see if Maxalt at the onset of a headache helps. Also discussed use of Tylenol with a Coke or Excedrin.

## 2025-03-12 PROBLEM — Z01.419 WOMEN'S ANNUAL ROUTINE GYNECOLOGICAL EXAMINATION: Status: RESOLVED | Noted: 2025-02-10 | Resolved: 2025-03-12

## 2025-03-24 ENCOUNTER — OFFICE VISIT (OUTPATIENT)
Dept: OBGYN CLINIC | Age: 35
End: 2025-03-24
Payer: COMMERCIAL

## 2025-03-24 ENCOUNTER — PROCEDURE VISIT (OUTPATIENT)
Dept: OBGYN CLINIC | Age: 35
End: 2025-03-24
Payer: COMMERCIAL

## 2025-03-24 VITALS
WEIGHT: 166 LBS | HEIGHT: 64 IN | DIASTOLIC BLOOD PRESSURE: 76 MMHG | BODY MASS INDEX: 28.34 KG/M2 | SYSTOLIC BLOOD PRESSURE: 122 MMHG

## 2025-03-24 DIAGNOSIS — N92.0 MENORRHAGIA WITH REGULAR CYCLE: Primary | ICD-10-CM

## 2025-03-24 PROCEDURE — 99213 OFFICE O/P EST LOW 20 MIN: CPT | Performed by: NURSE PRACTITIONER

## 2025-03-24 PROCEDURE — 76830 TRANSVAGINAL US NON-OB: CPT | Performed by: OBSTETRICS & GYNECOLOGY

## 2025-03-24 RX ORDER — TRANEXAMIC ACID 650 MG/1
1300 TABLET ORAL 3 TIMES DAILY
Qty: 30 TABLET | Refills: 11 | Status: SHIPPED | OUTPATIENT
Start: 2025-03-24

## 2025-03-24 NOTE — PROGRESS NOTES
Patient here for gyn f/u with US.    LAST PAP:  2/6/2024, neg., HPV neg.     LAST MAMMO:  never     LMP:  Patient's last menstrual period was 03/10/2025 (approximate).    BIRTH CONTROL:  condoms    TOBACCO USE:  No    FAMILY HISTORY OF:   Breast Cancer:  No   Ovarian Cancer:  No   Uterine Cancer:  No   Colon Cancer:  No    Vitals:    03/24/25 0808   BP: 122/76   BP Site: Left Upper Arm   Patient Position: Sitting   Weight: 75.3 kg (166 lb)   Height: 1.626 m (5' 4\")        NEGRITA RAYA RN  03/24/25  8:12 AM

## 2025-03-24 NOTE — ASSESSMENT & PLAN NOTE
2/10/25:    During 1 day of menses  No relief with mini pill or slynd  Plan labs and US  We discuss alternative hormonal birth control options, pt would like to wait until after US to decide  Using condoms for birth control     3/24/25: US today uterus normal, ES upper level of normal 20 mm, ovaries normal  Pt does nto wish to try hormonal option at this time OCP, mini pill, slynd have all caused mood changes  Recommend rechecking ES after next menses, will schedule  If again thickened, plan endo bx that day  We review option for lysteda as well as mirena, pt open to rx for lysteda, will think about mirena

## 2025-03-24 NOTE — PROGRESS NOTES
I have reviewed the patient's visit today including history, exam and assessment by TORSTEN Avalos.  I agree with treatment/plan as above.    Santo Glass MD  8:35 AM  03/24/25

## 2025-03-24 NOTE — PROGRESS NOTES
Tricia Iyer is a 34 y.o.        2/10/25: Menses: since delivery, menses have been heavier. She was on slynd for 3 months and then norethindrone. Menses lasting 6-7 days. For one day (day 2 of menses) extremely heavy, passing tissues and clots. Denies severe pain, only normal cramps  Pt is still nursing approx 3x/day    3/24/25: here today for ultrasound      Patient's last menstrual period was 03/10/2025 (approximate).  Date of last Cervical Cancer screen (HPV or PAP): 2024      Past Medical History:   Diagnosis Date    Anxiety     Depression     Gestational diabetes mellitus        Past Surgical History:   Procedure Laterality Date    BUNIONECTOMY Left        Family History   Problem Relation Age of Onset    Anemia Mother     High Blood Pressure Mother     Other Mother         Thyroid    Alcohol Abuse Father     Diabetes Maternal Grandfather     Breast Cancer Neg Hx     Colon Cancer Neg Hx     Uterine Cancer Neg Hx     Ovarian Cancer Neg Hx        Social History     Socioeconomic History    Marital status: Single     Spouse name: Not on file    Number of children: Not on file    Years of education: Not on file    Highest education level: Not on file   Occupational History    Not on file   Tobacco Use    Smoking status: Never    Smokeless tobacco: Never   Substance and Sexual Activity    Alcohol use: Not Currently    Drug use: Not Currently    Sexual activity: Yes     Partners: Male     Birth control/protection: Condom Male   Other Topics Concern    Not on file   Social History Narrative    Abuse: Feels safe at home, no history of physical abuse, no history of sexual abuse      Social Drivers of Health     Financial Resource Strain: Low Risk  (5/10/2024)    Overall Financial Resource Strain (CARDIA)     Difficulty of Paying Living Expenses: Not hard at all   Food Insecurity: Food Insecurity Present (2/10/2025)    Hunger Vital Sign     Worried About Running Out of Food in the Last Year: Sometimes

## 2025-04-10 ENCOUNTER — PROCEDURE VISIT (OUTPATIENT)
Dept: OBGYN CLINIC | Age: 35
End: 2025-04-10
Payer: COMMERCIAL

## 2025-04-10 DIAGNOSIS — N92.0 MENORRHAGIA WITH REGULAR CYCLE: Primary | ICD-10-CM

## 2025-04-10 PROCEDURE — 76830 TRANSVAGINAL US NON-OB: CPT | Performed by: OBSTETRICS & GYNECOLOGY

## 2025-04-14 ENCOUNTER — OFFICE VISIT (OUTPATIENT)
Dept: OBGYN CLINIC | Age: 35
End: 2025-04-14
Payer: COMMERCIAL

## 2025-04-14 VITALS
WEIGHT: 160 LBS | SYSTOLIC BLOOD PRESSURE: 118 MMHG | HEIGHT: 64 IN | BODY MASS INDEX: 27.31 KG/M2 | DIASTOLIC BLOOD PRESSURE: 64 MMHG

## 2025-04-14 DIAGNOSIS — N92.0 MENORRHAGIA WITH REGULAR CYCLE: Primary | ICD-10-CM

## 2025-04-14 PROCEDURE — 99213 OFFICE O/P EST LOW 20 MIN: CPT | Performed by: NURSE PRACTITIONER

## 2025-04-14 NOTE — PROGRESS NOTES
Patient here for  gyn f/u with review 4/10/25 US and discuss options.   Patient did not take lysteda.     LAST PAP:  2/6/2024, neg., HPV neg.     LAST MAMMO:  never     LMP:  Patient's last menstrual period was 04/03/2025 (exact date).    BIRTH CONTROL:  condoms    TOBACCO USE:  No    FAMILY HISTORY OF:   Breast Cancer:  No   Ovarian Cancer:  No   Uterine Cancer:  No   Colon Cancer:  No    Vitals:    04/14/25 1137   BP: 118/64   BP Site: Right Upper Arm   Patient Position: Sitting   Weight: 72.6 kg (160 lb)   Height: 1.626 m (5' 4\")        NEGRITA RAYA RN  04/14/25  11:42 AM

## 2025-04-14 NOTE — PROGRESS NOTES
Tricia Iyer is a 34 y.o.      2/10/25: Menses: since delivery, menses have been heavier. She was on slynd for 3 months and then norethindrone. Menses lasting 6-7 days. For one day (day 2 of menses) extremely heavy, passing tissues and clots. Denies severe pain, only normal cramps  Pt is still nursing approx 3x/day     3/24/25: here today for ultrasound    25: here today to discuss ultrasound results. Pt did not trial lysteda after last visit      Patient's last menstrual period was 2025 (exact date).  Date of last Cervical Cancer screen (HPV or PAP): 2024        Past Medical History:   Diagnosis Date    Anxiety     Depression     Gestational diabetes mellitus        Past Surgical History:   Procedure Laterality Date    BUNIONECTOMY Left        Family History   Problem Relation Age of Onset    Anemia Mother     High Blood Pressure Mother     Other Mother         Thyroid    Alcohol Abuse Father     Diabetes Maternal Grandfather     Breast Cancer Neg Hx     Colon Cancer Neg Hx     Uterine Cancer Neg Hx     Ovarian Cancer Neg Hx        Social History     Socioeconomic History    Marital status: Single     Spouse name: Not on file    Number of children: Not on file    Years of education: Not on file    Highest education level: Not on file   Occupational History    Not on file   Tobacco Use    Smoking status: Never    Smokeless tobacco: Never   Substance and Sexual Activity    Alcohol use: Not Currently    Drug use: Not Currently    Sexual activity: Yes     Partners: Male     Birth control/protection: Condom Male   Other Topics Concern    Not on file   Social History Narrative    Abuse: Feels safe at home, no history of physical abuse, no history of sexual abuse      Social Drivers of Health     Financial Resource Strain: Low Risk  (5/10/2024)    Overall Financial Resource Strain (CARDIA)     Difficulty of Paying Living Expenses: Not hard at all   Food Insecurity: Food Insecurity Present

## 2025-04-14 NOTE — ASSESSMENT & PLAN NOTE
2/10/25:    During 1 day of menses  No relief with mini pill or slynd  Plan labs and US  We discuss alternative hormonal birth control options, pt would like to wait until after US to decide  Using condoms for birth control      3/24/25: US today uterus normal, ES upper level of normal 20 mm, ovaries normal  Pt does nto wish to try hormonal option at this time OCP, mini pill, slynd have all caused mood changes  Recommend rechecking ES after next menses, will schedule  If again thickened, plan endo bx that day  We review option for lysteda as well as mirena, pt open to rx for lysteda, will think about mirena    4/14/25: US reviewed from 4/10/25 and ES 4.7 mm  Pt open to mirena IUD, order form completed  She does ask about hormone testing. We discuss I am open to checking E/P/T if pt desires bbut given she is pre-menopausal likely to be wnl. PCP is monitoring her thyroid. At this time, will not proceed with testing

## 2025-04-15 NOTE — PROGRESS NOTES
I have reviewed the patient's visit today including history, exam and assessment by TORSTEN Avalos.  I agree with treatment/plan as above.    Santo Glass MD  8:08 AM  04/15/25

## 2025-05-09 ENCOUNTER — OFFICE VISIT (OUTPATIENT)
Dept: PRIMARY CARE CLINIC | Facility: CLINIC | Age: 35
End: 2025-05-09
Payer: COMMERCIAL

## 2025-05-09 VITALS
BODY MASS INDEX: 27.31 KG/M2 | SYSTOLIC BLOOD PRESSURE: 112 MMHG | WEIGHT: 160 LBS | DIASTOLIC BLOOD PRESSURE: 72 MMHG | HEART RATE: 80 BPM | HEIGHT: 64 IN | OXYGEN SATURATION: 99 % | TEMPERATURE: 98.1 F

## 2025-05-09 DIAGNOSIS — G43.009 MIGRAINE WITHOUT AURA AND WITHOUT STATUS MIGRAINOSUS, NOT INTRACTABLE: ICD-10-CM

## 2025-05-09 DIAGNOSIS — E04.2 MULTIPLE THYROID NODULES: ICD-10-CM

## 2025-05-09 DIAGNOSIS — G43.909 EPISODIC MIGRAINE: Primary | ICD-10-CM

## 2025-05-09 PROCEDURE — 99214 OFFICE O/P EST MOD 30 MIN: CPT | Performed by: FAMILY MEDICINE

## 2025-05-09 ASSESSMENT — PATIENT HEALTH QUESTIONNAIRE - PHQ9
SUM OF ALL RESPONSES TO PHQ QUESTIONS 1-9: 0
SUM OF ALL RESPONSES TO PHQ QUESTIONS 1-9: 0
2. FEELING DOWN, DEPRESSED OR HOPELESS: NOT AT ALL
SUM OF ALL RESPONSES TO PHQ QUESTIONS 1-9: 0
SUM OF ALL RESPONSES TO PHQ QUESTIONS 1-9: 0
1. LITTLE INTEREST OR PLEASURE IN DOING THINGS: NOT AT ALL

## 2025-05-09 ASSESSMENT — ENCOUNTER SYMPTOMS
NAUSEA: 0
DIARRHEA: 0
VOMITING: 0
SHORTNESS OF BREATH: 0
COUGH: 0
ABDOMINAL PAIN: 0

## 2025-05-09 NOTE — ASSESSMENT & PLAN NOTE
Chronic, not controlled.  No improvement with Maxalt.  Patient having headaches most days of the week, qualifying for episodic, atypical migraines.  Reports vision changes with headaches so do not want to try another triptan.  Patient is breast-feeding so do not want to put on a beta-blocker or amitriptyline to avoid side effects with her baby.  Will prescribe Nurtec to take every other day for prophylaxis.  Discussed pushing fluids and stopping the Maxalt as it does not help and only makes her dizzy.

## 2025-05-09 NOTE — PROGRESS NOTES
[facility]  Charley Glass, DO  [address], [address]  [place] [id number] [phone]         ASSESSMENT AND PLAN    Problem List Items Addressed This Visit          Endocrine    Multiple thyroid nodules    Chronic issue, last ultrasound showed TR 3 nodules on her thyroid 1 year ago, will repeat thyroid ultrasound.  Labs in 3 months.         Relevant Orders    US HEAD NECK SOFT TISSUE       Nervous and Auditory    Migraine without aura and without status migrainosus, not intractable    Relevant Medications    rimegepant sulfate 75 MG TBDP       Other    Episodic migraine - Primary     Chronic, not controlled.  No improvement with Maxalt.  Patient having headaches most days of the week, qualifying for episodic, atypical migraines.  Reports vision changes with headaches so do not want to try another triptan.  Patient is breast-feeding so do not want to put on a beta-blocker or amitriptyline to avoid side effects with her baby.  Will prescribe Nurtec to take every other day for prophylaxis.  Discussed pushing fluids and stopping the Maxalt as it does not help and only makes her dizzy.         Relevant Medications    rimegepant sulfate 75 MG TBDP        The diagnoses and plan were discussed with the patient, who verbalizes understanding and agrees with plan.  All questions answered.    Chief Complaint    Chief Complaint   Patient presents with    Headache     Getting worse, everyday waking up            HISTORY OF PRESENT ILLNESS    34 y.o. female with migraines presents today for follow up.  Last seen three months ago, started on Maxalt as needed for headaches and treated with Augmentin for a sinus infection. Notes that she has had worsening headaches.  States that the Maxalt does not help and only makes her dizzy.  Often wakes up with them and it can go away and then come back.  Notes a strip on the left parietal scalp that hurts.  Gets hazy vision with the headaches

## 2025-05-09 NOTE — ASSESSMENT & PLAN NOTE
Chronic issue, last ultrasound showed TR 3 nodules on her thyroid 1 year ago, will repeat thyroid ultrasound.  Labs in 3 months.

## 2025-05-09 NOTE — PATIENT INSTRUCTIONS
IT WAS GREAT TO SEE YOU TODAY!    I WILL CONTACT YOU WITH THE RESULTS OF YOUR ULTRASOUND.    PLEASE TAKE ALL MEDICATION AS DISCUSSED.    ~TAKE THE NURTEC EVERY OTHER DAY TO HELP WITH MIGRAINES.    ~PUSH FLUIDS.  TAKE TYLENOL OR EXCEDRIN WITH A COKE TO HELP WITH ACUTE HEADACHES IF THE NURTEC DOES NOT HELP.    I WILL SEE YOU AGAIN IN 3 MONTHS BUT PLEASE CALL WITH CONCERNS [phone]

## 2025-05-14 ENCOUNTER — CLINICAL DOCUMENTATION (OUTPATIENT)
Dept: PRIMARY CARE CLINIC | Facility: CLINIC | Age: 35
End: 2025-05-14

## 2025-05-19 ENCOUNTER — PATIENT MESSAGE (OUTPATIENT)
Dept: PRIMARY CARE CLINIC | Facility: CLINIC | Age: 35
End: 2025-05-19

## 2025-05-19 DIAGNOSIS — H53.8 BLURRED VISION, BILATERAL: Primary | ICD-10-CM

## 2025-05-19 DIAGNOSIS — G43.009 MIGRAINE WITHOUT AURA AND WITHOUT STATUS MIGRAINOSUS, NOT INTRACTABLE: ICD-10-CM

## 2025-05-27 ENCOUNTER — RESULTS FOLLOW-UP (OUTPATIENT)
Dept: PRIMARY CARE CLINIC | Facility: CLINIC | Age: 35
End: 2025-05-27

## 2025-05-29 ENCOUNTER — OFFICE VISIT (OUTPATIENT)
Dept: PRIMARY CARE CLINIC | Facility: CLINIC | Age: 35
End: 2025-05-29
Payer: COMMERCIAL

## 2025-05-29 VITALS
HEART RATE: 70 BPM | SYSTOLIC BLOOD PRESSURE: 110 MMHG | BODY MASS INDEX: 27.76 KG/M2 | WEIGHT: 162.6 LBS | DIASTOLIC BLOOD PRESSURE: 70 MMHG | HEIGHT: 64 IN | TEMPERATURE: 97.2 F | OXYGEN SATURATION: 98 %

## 2025-05-29 DIAGNOSIS — R41.89 BRAIN FOG: ICD-10-CM

## 2025-05-29 DIAGNOSIS — R41.3 MEMORY DEFICIT: ICD-10-CM

## 2025-05-29 DIAGNOSIS — F44.9 DISSOCIATION: ICD-10-CM

## 2025-05-29 DIAGNOSIS — H53.8 BLURRED VISION, BILATERAL: Primary | ICD-10-CM

## 2025-05-29 DIAGNOSIS — R41.3 SHORT-TERM MEMORY LOSS: ICD-10-CM

## 2025-05-29 DIAGNOSIS — R25.2 HAND OR FOOT SPASMS: ICD-10-CM

## 2025-05-29 DIAGNOSIS — H53.453 ABNORMAL PERIPHERAL VISION OF BOTH EYES: ICD-10-CM

## 2025-05-29 PROCEDURE — 99215 OFFICE O/P EST HI 40 MIN: CPT

## 2025-05-29 RX ORDER — M-VIT,TX,IRON,MINS/CALC/FOLIC 27MG-0.4MG
1 TABLET ORAL DAILY
COMMUNITY

## 2025-05-29 NOTE — PROGRESS NOTES
ears: Chronic, not at goal  - Experiencing itchiness in both ears, primarily in the outer part.  - Some ear wax and dry skin present. Removed with q tip in office.  - Flonase prescribed to manage any fluid behind the eardrum.  - Advised to use Debrox solution for ear wax softening and to avoid frequent ear cleaning.  - Return, if symptoms persist past treatment.    6. Dissociation: Chronic, intermittent.  - Reports frequent dissociative episodes, feeling disconnected from body and actions.  - Episodes are not associated with confusion and resolve spontaneously.  - Monitoring condition and considering further evaluation if symptoms persist.    Follow-up  - CT scan to be ordered for further investigation of memory loss, bilateral blurred vision, and right hand spasms.      Orders Placed This Encounter   Procedures    CT HEAD W WO CONTRAST     Standing Status:   Future     Expected Date:   5/29/2025     Expiration Date:   5/29/2026     STAT Creatinine as needed::   No     Reason for exam::   vision changes not explained by eye doctor x 3, brain fog, short term memory issues, language recall issues, right hand dropping things       No orders of the defined types were placed in this encounter.      No follow-ups on file.    The diagnoses and plan were discussed with the patient, who verbalizes understanding and agrees with plan.  All questions answered.    The patient (or guardian, if applicable) and other individuals in attendance with the patient were advised that Artificial Intelligence will be utilized during this visit to record, process the conversation to generate a clinical note, and support improvement of the AI technology. The patient (or guardian, if applicable) and other individuals in attendance at the appointment consented to the use of AI, including the recording.      Silvia Patton PA-C  6:53 PM  05/29/25    On this date 5/29/2025 I have spent 43 minutes reviewing previous notes, test results and face

## 2025-05-30 ENCOUNTER — PATIENT MESSAGE (OUTPATIENT)
Dept: PRIMARY CARE CLINIC | Facility: CLINIC | Age: 35
End: 2025-05-30

## 2025-06-11 ENCOUNTER — HOSPITAL ENCOUNTER (OUTPATIENT)
Dept: CT IMAGING | Age: 35
Discharge: HOME | End: 2025-06-14
Payer: COMMERCIAL

## 2025-06-11 ENCOUNTER — RESULTS FOLLOW-UP (OUTPATIENT)
Dept: PRIMARY CARE CLINIC | Facility: CLINIC | Age: 35
End: 2025-06-11

## 2025-06-11 DIAGNOSIS — H53.8 BLURRED VISION, BILATERAL: ICD-10-CM

## 2025-06-11 DIAGNOSIS — R41.89 BRAIN FOG: ICD-10-CM

## 2025-06-11 DIAGNOSIS — R41.3 MEMORY DEFICIT: ICD-10-CM

## 2025-06-11 DIAGNOSIS — F44.9 DISSOCIATION: ICD-10-CM

## 2025-06-11 DIAGNOSIS — H53.453 ABNORMAL PERIPHERAL VISION OF BOTH EYES: ICD-10-CM

## 2025-06-11 DIAGNOSIS — R25.2 HAND OR FOOT SPASMS: ICD-10-CM

## 2025-06-11 DIAGNOSIS — R41.3 SHORT-TERM MEMORY LOSS: ICD-10-CM

## 2025-06-11 PROCEDURE — 6360000004 HC RX CONTRAST MEDICATION

## 2025-06-11 PROCEDURE — 70470 CT HEAD/BRAIN W/O & W/DYE: CPT

## 2025-06-11 RX ORDER — IOPAMIDOL 755 MG/ML
100 INJECTION, SOLUTION INTRAVASCULAR
Status: COMPLETED | OUTPATIENT
Start: 2025-06-11 | End: 2025-06-11

## 2025-06-11 RX ADMIN — IOPAMIDOL 100 ML: 755 INJECTION, SOLUTION INTRAVENOUS at 08:14

## 2025-07-13 ENCOUNTER — PATIENT MESSAGE (OUTPATIENT)
Dept: PRIMARY CARE CLINIC | Facility: CLINIC | Age: 35
End: 2025-07-13

## 2025-07-14 PROBLEM — Z86.32 HISTORY OF DIET-CONTROLLED GESTATIONAL DIABETES MELLITUS: Status: RESOLVED | Noted: 2024-05-10 | Resolved: 2025-07-14

## 2025-07-14 PROBLEM — Z30.430 ENCOUNTER FOR IUD INSERTION: Status: ACTIVE | Noted: 2025-07-14

## 2025-07-17 NOTE — TELEPHONE ENCOUNTER
To whom it may concern,     This letter is on behalf of our patient Tricia Iyer (1990) to appeal the coverage denial of the CT head with and without contrast that was ordered on 5/30/2025 that was completed on 6/11/2025.     Tricia presented with bilateral blurred vision, hand spasms, memory deficit, intermittent, abnormal peripheral vision bilaterally, short-term memory loss, brain fog and dissociation. Many of these symptoms had occurred chronically so the CT was ordered non-urgently.     The list of differential diagnoses included, but was not limited to: brain mass (e.g. pituitary adenoma), old stroke, forgotten trauma, slow brain bleed, volume loss (e.g. early onset dementia), symptomatic vessel abnormality, abscess, and multiple sclerosis.    CT with and without contrast was an appropriate and needed image study. I am unclear why the decision to cover this imaging was then repealed, but it seems that you are declining to cover a medically necessary study.     Please notify our office, if the denial is due to error on your behalf or due to more information being needed. We will be happy to provide any additional information needed. Tricia has given verbal consent for us to appeal on her behalf.     Thank you so much for your time and hope you have a great day,     Silvia Patton PA-C  7/17/25  7:26 PM

## 2025-08-10 PROBLEM — Z30.431 IUD CHECK UP: Status: ACTIVE | Noted: 2025-08-10

## 2025-08-11 ENCOUNTER — OFFICE VISIT (OUTPATIENT)
Dept: OBGYN CLINIC | Age: 35
End: 2025-08-11
Payer: COMMERCIAL

## 2025-08-11 VITALS
BODY MASS INDEX: 27.83 KG/M2 | DIASTOLIC BLOOD PRESSURE: 70 MMHG | SYSTOLIC BLOOD PRESSURE: 118 MMHG | HEIGHT: 64 IN | WEIGHT: 163 LBS

## 2025-08-11 DIAGNOSIS — Z30.431 IUD CHECK UP: Primary | ICD-10-CM

## 2025-08-11 PROCEDURE — 99459 PELVIC EXAMINATION: CPT | Performed by: OBSTETRICS & GYNECOLOGY

## 2025-08-11 PROCEDURE — 99212 OFFICE O/P EST SF 10 MIN: CPT | Performed by: OBSTETRICS & GYNECOLOGY

## 2025-08-14 ENCOUNTER — CLINICAL DOCUMENTATION (OUTPATIENT)
Dept: PRIMARY CARE CLINIC | Facility: CLINIC | Age: 35
End: 2025-08-14

## 2025-08-14 ENCOUNTER — OFFICE VISIT (OUTPATIENT)
Dept: PRIMARY CARE CLINIC | Facility: CLINIC | Age: 35
End: 2025-08-14
Payer: COMMERCIAL

## 2025-08-14 VITALS
SYSTOLIC BLOOD PRESSURE: 108 MMHG | WEIGHT: 163 LBS | DIASTOLIC BLOOD PRESSURE: 64 MMHG | TEMPERATURE: 98.1 F | BODY MASS INDEX: 27.83 KG/M2 | HEART RATE: 78 BPM | HEIGHT: 64 IN | OXYGEN SATURATION: 99 %

## 2025-08-14 DIAGNOSIS — G43.009 MIGRAINE WITHOUT AURA AND WITHOUT STATUS MIGRAINOSUS, NOT INTRACTABLE: ICD-10-CM

## 2025-08-14 DIAGNOSIS — M77.8 RIGHT WRIST TENDONITIS: Primary | ICD-10-CM

## 2025-08-14 DIAGNOSIS — L29.9 ITCHING OF EAR: ICD-10-CM

## 2025-08-14 PROCEDURE — 99213 OFFICE O/P EST LOW 20 MIN: CPT | Performed by: FAMILY MEDICINE

## 2025-08-14 RX ORDER — FLUOCINOLONE ACETONIDE 0.11 MG/ML
1 OIL AURICULAR (OTIC) 2 TIMES DAILY
Qty: 20 ML | Refills: 2 | Status: SHIPPED | OUTPATIENT
Start: 2025-08-14

## 2025-08-14 RX ORDER — METHYLPREDNISOLONE 4 MG/1
TABLET ORAL
Qty: 21 TABLET | Refills: 0 | Status: SHIPPED | OUTPATIENT
Start: 2025-08-14 | End: 2025-08-20

## 2025-08-14 ASSESSMENT — ENCOUNTER SYMPTOMS
DIARRHEA: 0
SHORTNESS OF BREATH: 0
NAUSEA: 0
VOMITING: 0
COUGH: 0
ABDOMINAL PAIN: 0

## 2025-08-14 ASSESSMENT — PATIENT HEALTH QUESTIONNAIRE - PHQ9
2. FEELING DOWN, DEPRESSED OR HOPELESS: NOT AT ALL
SUM OF ALL RESPONSES TO PHQ QUESTIONS 1-9: 0
1. LITTLE INTEREST OR PLEASURE IN DOING THINGS: NOT AT ALL

## 2025-08-18 ENCOUNTER — TELEPHONE (OUTPATIENT)
Dept: PRIMARY CARE CLINIC | Facility: CLINIC | Age: 35
End: 2025-08-18